# Patient Record
Sex: FEMALE | Race: WHITE | Employment: FULL TIME | URBAN - METROPOLITAN AREA
[De-identification: names, ages, dates, MRNs, and addresses within clinical notes are randomized per-mention and may not be internally consistent; named-entity substitution may affect disease eponyms.]

---
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
## 2018-01-26 ENCOUNTER — OFFICE VISIT (OUTPATIENT)
Dept: BEHAVIORAL/MENTAL HEALTH CLINIC | Age: 18
End: 2018-01-26

## 2018-01-26 VITALS
BODY MASS INDEX: 20.98 KG/M2 | HEART RATE: 81 BPM | DIASTOLIC BLOOD PRESSURE: 65 MMHG | WEIGHT: 114 LBS | HEIGHT: 62 IN | SYSTOLIC BLOOD PRESSURE: 117 MMHG

## 2018-01-26 DIAGNOSIS — F33.2 SEVERE EPISODE OF RECURRENT MAJOR DEPRESSIVE DISORDER, WITHOUT PSYCHOTIC FEATURES (HCC): Primary | ICD-10-CM

## 2018-01-26 DIAGNOSIS — F41.9 ANXIETY: ICD-10-CM

## 2018-01-26 RX ORDER — ARIPIPRAZOLE 5 MG/1
5 TABLET ORAL DAILY
Qty: 30 TAB | Refills: 1 | Status: SHIPPED | OUTPATIENT
Start: 2018-01-26 | End: 2018-02-22 | Stop reason: SDUPTHER

## 2018-01-26 NOTE — MR AVS SNAPSHOT
[address] 
[phone] Patient: Constantine Haynes MRN: [id number] AEC:[birth year] Visit Information Date & Time Provider Department Dept. Phone Encounter #  
 1/26/2018 11:30 AM Dannis Simmonds, NP [address] [facility] [phone] [id number] Follow-up Instructions Return in about 5 weeks (around 3/2/2018). Upcoming Health Maintenance Date Due DTaP/Tdap/Td series (2 - Tdap) 4/16/2010 HPV AGE 9Y-26Y (2 of 3 - Female 3 Dose Series) 4/5/2016 Influenza Age 5 to Adult 8/1/2017 Allergies as of 1/26/2018  Review Complete On: 1/26/2018 By: Lara Luis Severity Noted Reaction Type Reactions Peanut High 07/14/2017    Anaphylaxis Current Immunizations  Never Reviewed Name Date DTaP-Hep B-IPV 4/15/1999, 1/20/1999, 1998 HPV 2/9/2016 Hep A Vaccine 2/9/2016, 11/7/2006 Hib 9/30/1999, 4/15/1999, 1/20/1999, 1998 IPV 8/26/2003, 9/30/1999, 4/15/1999, 1/20/1999, 1998 MMR 8/26/2003, 1/21/2000 Meningococcal (MCV4) Vaccine 2/9/2016, 9/22/2010 Td 3/19/2010 Tdap 8/26/2003, 9/30/1999, 4/15/1999, 1/20/1999, 1998 Varicella Virus Vaccine 9/22/2010, 1/21/2000 Not reviewed this visit You Were Diagnosed With   
  
 Codes Comments Severe episode of recurrent major depressive disorder, without psychotic features ([facility] [address]    -  Primary ICD-10-CM: F33.2 ICD-9-CM: 296.33 with mixed features Anxiety     ICD-10-CM: F41.9 ICD-9-CM: 300.00 Depression, unspecified depression type     ICD-10-CM: F32.9 ICD-9-CM: 328 Vitals BP Pulse Height(growth percentile) Weight(growth percentile) BMI OB Status 117/65 (81 %/ 57 %)* 81 5' 2\" (1.575 m) (19 %, Z= -0.90) 114 lb (51.7 kg) (23 %, Z= -0.73) 20.85 kg/m2 (40 %, Z= -0.25) Having regular periods Smoking Status Never Smoker *BP percentiles are based on NHBPEP's 4th Report Growth percentiles are based on [facility] 2-20 Years data. BMI and BSA Data Body Mass Index Body Surface Area  
 20.85 kg/m 2 1.5 m 2 Preferred Pharmacy Pharmacy Name Phone [facility] [address] [place] [address] [place] [address] [phone] Your Updated Medication List  
  
   
This list is accurate as of: 1/26/18 12:06 PM.  Always use your most recent med list.  
  
  
  
  
 ARIPiprazole 5 mg tablet Commonly known as:  ABILIFY Take 1 Tab by mouth daily. norgestimate-ethinyl estradiol 0.18/0.215/0.25 mg-35 mcg (28) Tab Commonly known as:  ORTHO TRI-CYCLEN, TRI-SPRINTEC Take 1 Tab by mouth daily. Indications: DYSMENORRHEA  
  
 simethicone 125 mg capsule Commonly known as:  GAS-X Take 125 mg by mouth four (4) times daily as needed for Flatulence. vortioxetine 5 mg tablet Commonly known as:  TRINTELLIX Take 1 Tab by mouth daily. Prescriptions Sent to Pharmacy Refills ARIPiprazole (ABILIFY) 5 mg tablet 1 Sig: Take 1 Tab by mouth daily. Class: Normal  
 Pharmacy: [facility] #: [phone] Route: Oral  
 vortioxetine (TRINTELLIX) 5 mg tablet 3 Sig: Take 1 Tab by mouth daily. Class: Normal  
 Pharmacy: [address] #: [phone] Route: Oral  
  
Follow-up Instructions Return in about 5 weeks (around 3/2/2018). Introducing [facility] & [facility]! Abhishek Xiong introduces [employer] patient portal. Now you can access parts of your medical record, email your doctor's office, and request medication refills online. 1. In your internet browser, go to [email or web address] 2. Click on the First Time User? Click Here link in the Sign In box. You will see the New Member Sign Up page. 3. Enter your [employer] Access Code exactly as it appears below. You will not need to use this code after youve completed the sign-up process. If you do not sign up before the expiration date, you must request a new code. · [employer]itus Access Code: [email or web address] Expires: 3/21/2018  9:55 AM 
 
4. Enter the last four digits of your Social Security Number (xxxx) and Date of Birth (mm/dd/yyyy) as indicated and click Submit. You will be taken to the next sign-up page. 5. Create a ubitus ID. This will be your ubitus login ID and cannot be changed, so think of one that is secure and easy to remember. 6. Create a ubitus password. You can change your password at any time. 7. Enter your Password Reset Question and Answer. This can be used at a later time if you forget your password. 8. Enter your e-mail address. You will receive e-mail notification when new information is available in [address] 9. Click Sign Up. You can now view and download portions of your medical record. 10. Click the Download Summary menu link to download a portable copy of your medical information. If you have questions, please visit the Frequently Asked Questions section of the [employer]itus website. Remember, [employer]itus is NOT to be used for urgent needs. For medical emergencies, dial 911. Now available from your iPhone and Android! Please provide this summary of care documentation to your next provider. Your primary care clinician is listed as Melania Grigsby. If you have any questions after today's visit, please call [phone].

## 2018-01-26 NOTE — PROGRESS NOTES
CHIEF COMPLAINT:  Sue Delgdao is a 21 y.o. female and was seen today for follow-up of psychiatric condition and psychotropic medication management. HPI:    HPI    Angel Dixon reports the following psychiatric symptoms:  depression, agitation and anxiety. The symptoms have been present for years and are of moderate/high severity. The symptoms occur daily. Pt reports that symptoms decreased in severity with current tx plan. Pt is here today to discuss her tx plan. FAMILY/SOCIAL HX: Mother's MH is doing better on new medications. REVIEW OF SYSTEMS:  ROS    Psychiatric:  depression, anxiety  Appetite:no change from normal   Sleep: good -improved with medication. Neuro: None noted    Visit Vitals    /65    Pulse 81    Ht 5' 2\" (1.575 m)    Wt 51.7 kg (114 lb)    BMI 20.85 kg/m2       Side Effects:  none    MENTAL STATUS EXAM:   Sensorium  oriented to time, place and person   Relations cooperative   Appearance:  casually dressed   Motor Behavior:  within normal limits   Speech:  normal pitch and normal volume   Thought Process: goal directed   Thought Content free of delusions and free of hallucinations   Suicidal ideations no plan  and no intention/ death wishes    Homicidal ideations no plan  and no intention   Mood:  anxious, depressed and sad   Affect:  depressed and sad   Memory recent  adequate and impaired   Memory remote:  adequate   Concentration:  adequate   Abstraction:  abstract   Insight:  good   Reliability good   Judgment:  good     MEDICAL DECISION MAKING:  Problems addressed today:    ICD-10-CM ICD-9-CM    1. Severe episode of recurrent major depressive disorder, without psychotic features [facility] F33.2 296.33 vortioxetine (TRINTELLIX) 5 mg tablet    with mixed features   2. Anxiety F41.9 300.00        Assessment:   Angel Dixon is responding to treatment. Pt reports that symptoms did decrease in severity with current tx plan.  Pt reports that after being on 2mg of abilify for about 3 weeks, her symptoms started to increase again. Will increase to 5mg of abilify to help tx depression with mixed features. Pt reports that she stopped taking trintillex for a week, discussed resuming it at 5mg dose. Discussed the importance of ind therapy to better tx symptoms and pt agrees. Pt given resources for ind therapy. Provided support and encouragement. SAFE-T ASSESSMENT:   Risk Factors: depressive and anxiety symptoms. Protective Factors/strengths: Animals, does not like pain, supportive parents. Suicide Thoughts/Plans/Behaviors/Intent: Denies thoughts but does endorses death wishes at times. Assessment of risk/intervention/follow up: Will f/u     PSYCHOTHERAPY:  approx 20 minutes  Type:  Mindfulness, Supportive  Focus:  Discussing coping mechanisms to help tx symptoms. Discussed different mindfulness techniques to help decrease symptoms. Discussed how to incorporate mindfulness techniques into home and work life. Rios Childs is progressing. Total encounter time was 30 minutes; >50% of time was spent counseling/coordinating care regarding depression and anxiety. Plan:   1. Current Outpatient Prescriptions   Medication Sig Dispense Refill    ARIPiprazole (ABILIFY) 5 mg tablet Take 1 Tab by mouth daily. 30 Tab 1    vortioxetine (TRINTELLIX) 5 mg tablet Take 1 Tab by mouth daily. 30 Tab 3    norgestimate-ethinyl estradiol (ORTHO TRI-CYCLEN, TRI-SPRINTEC) 0.18/0.215/0.25 mg-35 mcg (28) tab Take 1 Tab by mouth daily. Indications: DYSMENORRHEA 3 Dose Pack 3    simethicone (GAS-X) 125 mg capsule Take 125 mg by mouth four (4) times daily as needed for Flatulence. medication changes made today: cont trintillex 5mg for depression/anxiety, increase abilify 5mg for tx resistant depression/mixed features. 2.  Counseling and coordination of care including instructions for treatment, risks/benefits, risk factor reduction and patient/family education. She agrees with the plan.  Patient instructed to call with any side effects, questions or issues. 3.  Follow-up Disposition:  Return in about 5 weeks (around 3/2/2018).     1/26/2018  [address] [place] Katie, JACKIE

## 2018-02-22 ENCOUNTER — OFFICE VISIT (OUTPATIENT)
Dept: BEHAVIORAL/MENTAL HEALTH CLINIC | Age: 18
End: 2018-02-22

## 2018-02-22 VITALS
SYSTOLIC BLOOD PRESSURE: 104 MMHG | BODY MASS INDEX: 20.24 KG/M2 | HEIGHT: 62 IN | WEIGHT: 110 LBS | DIASTOLIC BLOOD PRESSURE: 74 MMHG | HEART RATE: 85 BPM

## 2018-02-22 DIAGNOSIS — F33.1 MODERATE EPISODE OF RECURRENT MAJOR DEPRESSIVE DISORDER (HCC): Primary | ICD-10-CM

## 2018-02-22 DIAGNOSIS — F41.9 ANXIETY: ICD-10-CM

## 2018-02-22 RX ORDER — ARIPIPRAZOLE 5 MG/1
5 TABLET ORAL DAILY
Qty: 30 TAB | Refills: 1 | Status: SHIPPED | OUTPATIENT
Start: 2018-02-22 | End: 2018-04-26 | Stop reason: SDDI

## 2018-02-22 NOTE — PROGRESS NOTES
CHIEF COMPLAINT:  Gnei Jordan is a 21 y.o. female and was seen today for follow-up of psychiatric condition and psychotropic medication management. HPI:    HPI    Louisa Meza reports the following psychiatric symptoms:  depression, agitation and anxiety. The symptoms have been present for years and are of moderate severity. The symptoms occur daily. Pt reports symptoms have decreased with current tx plan. Pt is here today to discuss her tx plan. FAMILY/SOCIAL HX: Has hung out with friends more lately. Will be participating in GI study for medication. REVIEW OF SYSTEMS:  ROS    Psychiatric:  depression, anxiety  Appetite:\"up and down still due to GI problems\" -lost 4 lbs since last visit   Sleep: improved   Neuro: none noted     Visit Vitals    /74    Pulse 85    Ht 5' 2\" (1.575 m)    Wt 49.9 kg (110 lb)    BMI 20.12 kg/m2       Side Effects:  dizziness, akathisia    MENTAL STATUS EXAM:   Sensorium  oriented to time, place and person   Relations cooperative   Appearance:  casually dressed   Motor Behavior:  within normal limits   Speech:  normal pitch and normal volume   Thought Process: goal directed   Thought Content free of delusions and free of hallucinations   Suicidal ideations no plan  and no intention   Homicidal ideations no plan  and no intention   Mood:  anxious and depressed   Affect:  anxious   Memory recent  adequate   Memory remote:  adequate   Concentration:  adequate   Abstraction:  abstract   Insight:  good   Reliability good   Judgment:  good     MEDICAL DECISION MAKING:  Problems addressed today:    ICD-10-CM ICD-9-CM    1. Moderate episode of recurrent major depressive disorder (HCC) F33.1 296.32 vortioxetine (TRINTELLIX) 10 mg tablet    with mixed features   2. Anxiety F41.9 300.00        Assessment:   Louisa Meza is responding to treatment. Pt reports symptoms have decreased in severity.  Pt is still reporting depression and anxiety that are moderate in severity and not being able to focus or stay on task. Discussed how these symptoms could be related to depression and anxiety and that we should reassess these symptoms after treating depression and anxiety adequately. Pt verbalized understanding. Will increase Trintillex to 10mg and continue abilify at 5mg for symptoms. Pt does report some side effects r/t to abilify including dizziness and akathisia. Pt reports that it is not problematic for her at this time and is going to try to take it at night to see if this provides her with a decrease in symptoms. Discussed the importance of ind therapy to better tx symptoms and pt is interested but has a hard time following through with calling to set up the appointment. Provided psycho education re: CBT and challenging negative thoughts. Provided support and encouragement. SAFE-T ASSESSMENT:   Risk Factors: depressive and anxiety symptoms. Protective Factors/strengths: Animals, does not like pain, supportive parents. Suicide Thoughts/Plans/Behaviors/Intent: Denies    Assessment of risk/intervention/follow up: Will f/u       Total encounter time was 25 minutes; >50% of time was spent counseling/coordinating care regarding anxiety/depression. Plan:   1. Current Outpatient Prescriptions   Medication Sig Dispense Refill    ARIPiprazole (ABILIFY) 5 mg tablet Take 1 Tab by mouth daily. 30 Tab 1    vortioxetine (TRINTELLIX) 10 mg tablet Take 1 Tab by mouth daily. 30 Tab 1    norgestimate-ethinyl estradiol (ORTHO TRI-CYCLEN, TRI-SPRINTEC) 0.18/0.215/0.25 mg-35 mcg (28) tab Take 1 Tab by mouth daily. Indications: DYSMENORRHEA 3 Dose Pack 3    simethicone (GAS-X) 125 mg capsule Take 125 mg by mouth four (4) times daily as needed for Flatulence. medication changes made today: increase trintillex 10 mg for depression/anxiety, cont abilify 5mg for tx resistant depression/mixed features.      2.  Counseling and coordination of care including instructions for treatment, risks/benefits, risk factor reduction and patient/family education. She agrees with the plan. Patient instructed to call with any side effects, questions or issues. 3.  Follow-up Disposition:  Return in about 8 weeks (around 4/19/2018).     2/22/2018  [address] [place] Katie, JACKIE

## 2018-04-26 ENCOUNTER — OFFICE VISIT (OUTPATIENT)
Dept: BEHAVIORAL/MENTAL HEALTH CLINIC | Age: 18
End: 2018-04-26

## 2018-04-26 VITALS
HEART RATE: 79 BPM | BODY MASS INDEX: 20.24 KG/M2 | WEIGHT: 110 LBS | SYSTOLIC BLOOD PRESSURE: 113 MMHG | DIASTOLIC BLOOD PRESSURE: 76 MMHG | HEIGHT: 62 IN

## 2018-04-26 DIAGNOSIS — F33.1 MODERATE EPISODE OF RECURRENT MAJOR DEPRESSIVE DISORDER (HCC): Primary | ICD-10-CM

## 2018-04-26 DIAGNOSIS — F41.9 ANXIETY: ICD-10-CM

## 2018-04-26 NOTE — PROGRESS NOTES
CHIEF COMPLAINT:  Tiff Escalante is a 21 y.o. female and was seen today for follow-up of psychiatric condition and psychotropic medication management. HPI:    HPI    Sindy Hernandez reports the following psychiatric symptoms:  depression, agitation and anxiety. The symptoms have been present for years and are of moderate/high severity. The symptoms occur daily. Pt reports symptoms have remained the same with current tx plan. Pt is here today to discuss her tx plan. FAMILY/SOCIAL HX: Mother is going to stop taking her antidepressant to see if her symptoms are still in remission without medication. REVIEW OF SYSTEMS:  ROS    Psychiatric:  anxiety  Appetite:increased- no weight gain or weight loss since last appointment. Sleep: poor with DIS (difficulty initiating sleep) uses OTC melatonin with benefit. Neuro: Denies    Visit Vitals    /76    Pulse 79    Ht 5' 2\" (1.575 m)    Wt 49.9 kg (110 lb)    BMI 20.12 kg/m2       Side Effects:  akathisia and dizziness ( with trintellix and abilify)    MENTAL STATUS EXAM:   Sensorium  oriented to time, place and person   Relations cooperative   Appearance:  casually dressed   Motor Behavior:  within normal limits   Speech:  normal pitch and normal volume   Thought Process: goal directed   Thought Content free of delusions and free of hallucinations   Suicidal ideations no plan  and no intention   Homicidal ideations no plan  and no intention   Mood:  anxious   Affect:  anxious   Memory recent  adequate and impaired   Memory remote:  adequate   Concentration:  adequate and impaired   Abstraction:  abstract   Insight:  fair and good   Reliability fair   Judgment:  fair and good     MEDICAL DECISION MAKING:  Problems addressed today:    ICD-10-CM ICD-9-CM    1. Moderate episode of recurrent major depressive disorder (HCC) F33.1 296.32    2. Anxiety F41.9 300.00        Assessment:   Sindy Hernandez is not responding to treatment.  Pt stopped taking all her medications a few days ago due to her not feeling like they are beneficial for symptoms. Pt would like to discuss other tx options today and would like to d/c trintellix and abilify. Discussed tx options and side effects today and pt verbalized understanding. Today, will start vraylar 1.5mg for tx resistant depression. Pt reports she has been unsuccessful in getting ind therapy set up but is still trying to get an appointment. Also, recommended TMS tx due to tx resistant symptoms with multiple medication trials. Pt given information on [address] [place] and will look into it. Provided support and encouragement. SAFE-T ASSESSMENT:   Risk Factors: depressive and anxiety symptoms. Protective Factors/strengths: Animals, does not like pain, supportive parents. Suicide Thoughts/Plans/Behaviors/Intent: Denies    Assessment of risk/intervention/follow up: Will f/u       Plan:   1. Current Outpatient Prescriptions   Medication Sig Dispense Refill    cariprazine (VRAYLAR) 1.5 mg capsule Take 1 Cap by mouth daily. 30 Cap 1    norgestimate-ethinyl estradiol (ORTHO TRI-CYCLEN, TRI-SPRINTEC) 0.18/0.215/0.25 mg-35 mcg (28) tab Take 1 Tab by mouth daily. Indications: DYSMENORRHEA 3 Dose Pack 3    simethicone (GAS-X) 125 mg capsule Take 125 mg by mouth four (4) times daily as needed for Flatulence. medication changes made today: d/c trintillex, d/c Abilify, start vraylar 1.5mg for tx resistant depression. 2.  Counseling and coordination of care including instructions for treatment, risks/benefits, risk factor reduction and patient/family education. She agrees with the plan. Patient instructed to call with any side effects, questions or issues. 3.  Follow-up Disposition:  Return in about 3 weeks (around 5/17/2018).     4/26/2018  Mary Harman NP

## 2018-04-26 NOTE — MR AVS SNAPSHOT
[address] [facility] 
[phone] Patient: Shalonda Fulton MRN: [id number] NGR:[birth year] Visit Information Date & Time Provider Department Dept. Phone Encounter #  
 4/26/2018  3:30 PM [address], [place] [address] [facility] [phone] [id number] Follow-up Instructions Return in about 3 weeks (around 5/17/2018). Upcoming Health Maintenance Date Due DTaP/Tdap/Td series (2 - Tdap) 4/16/2010 HPV Age 9Y-34Y (2 of 3 - Female 3 Dose Series) 4/5/2016 Influenza Age 5 to Adult 8/1/2017 Allergies as of 4/26/2018  Review Complete On: 4/26/2018 By: Faby Wilson NP Severity Noted Reaction Type Reactions Peanut High 07/14/2017    Anaphylaxis Current Immunizations  Never Reviewed Name Date DTaP-Hep B-IPV 4/15/1999, 1/20/1999, 1998 HPV 2/9/2016 Hep A Vaccine 2/9/2016, 11/7/2006 Hib 9/30/1999, 4/15/1999, 1/20/1999, 1998 IPV 8/26/2003, 9/30/1999, 4/15/1999, 1/20/1999, 1998 MMR 8/26/2003, 1/21/2000 Meningococcal (MCV4) Vaccine 2/9/2016, 9/22/2010 Td 3/19/2010 Tdap 8/26/2003, 9/30/1999, 4/15/1999, 1/20/1999, 1998 Varicella Virus Vaccine 9/22/2010, 1/21/2000 Not reviewed this visit You Were Diagnosed With   
  
 Codes Comments Moderate episode of recurrent major depressive disorder ([facility]    -  Primary ICD-10-CM: F33.1 ICD-9-CM: 296.32 Anxiety     ICD-10-CM: F41.9 ICD-9-CM: 300.00 Vitals BP Pulse Height(growth percentile) Weight(growth percentile) BMI OB Status 113/76 (70 %/ 89 %)* 79 5' 2\" (1.575 m) (18 %, Z= -0.90) 110 lb (49.9 kg) (15 %, Z= -1.02) 20.12 kg/m2 (30 %, Z= -0.53) Having regular periods Smoking Status Never Smoker *BP percentiles are based on NHBPEP's 4th Report Growth percentiles are based on [facility] 2-20 Years data. Vitals History BMI and BSA Data Body Mass Index Body Surface Area  
 20.12 kg/m 2 1.48 m 2 Preferred Pharmacy Pharmacy Name Phone [facility] [address] [place] [address] [phone] Your Updated Medication List  
  
   
This list is accurate as of 4/26/18  4:17 PM.  Always use your most recent med list.  
  
  
  
  
 cariprazine 1.5 mg capsule Commonly known as:  [place] Take 1 Cap by mouth daily. norgestimate-ethinyl estradiol 0.18/0.215/0.25 mg-35 mcg (28) Tab Commonly known as:  ORTHO TRI-CYCLEN, TRI-SPRINTEC Take 1 Tab by mouth daily. Indications: DYSMENORRHEA  
  
 simethicone 125 mg capsule Commonly known as:  GAS-X Take 125 mg by mouth four (4) times daily as needed for Flatulence. Prescriptions Sent to Pharmacy Refills  
 cariprazine (VRAYLAR) 1.5 mg capsule 1 Sig: Take 1 Cap by mouth daily. Class: Normal  
 Pharmacy: [address] Ph #: [phone] Route: Oral  
  
Follow-up Instructions Return in about 3 weeks (around 5/17/2018). Introducing [facility]! [employer] introduces [facility] [place] [facility] patient portal. Now you can access parts of your medical record, email your doctor's office, and request medication refills online. 1. In your internet browser, go to [email or web address]. [email or web address] 2. Click on the First Time User? Click Here link in the Sign In box. You will see the New Member Sign Up page. 3. Enter your [facility] Access Code exactly as it appears below. You will not need to use this code after youve completed the sign-up process. If you do not sign up before the expiration date, you must request a new code. · [facility] [place] [facility] Access Code: [facility] Expires: 7/25/2018  4:17 PM 
 
4. Enter the last four digits of your Social Security Number (xxxx) and Date of Birth (mm/dd/yyyy) as indicated and click Submit.  You will be taken to the next sign-up page. 5. Create a [facility] ID. This will be your [facility] login ID and cannot be changed, so think of one that is secure and easy to remember. 6. Create a [facility] password. You can change your password at any time. 7. Enter your Password Reset Question and Answer. This can be used at a later time if you forget your password. 8. Enter your e-mail address. You will receive e-mail notification when new information is available in [address] 9. Click Sign Up. You can now view and download portions of your medical record. 10. Click the Download Summary menu link to download a portable copy of your medical information. If you have questions, please visit the Frequently Asked Questions section of the MEEP website. Remember, [facility] is NOT to be used for urgent needs. For medical emergencies, dial 911. Now available from your iPhone and Android! Please provide this summary of care documentation to your next provider. Your primary care clinician is listed as Leana Varma. If you have any questions after today's visit, please call [phone].

## 2018-06-15 ENCOUNTER — OFFICE VISIT (OUTPATIENT)
Dept: BEHAVIORAL/MENTAL HEALTH CLINIC | Age: 18
End: 2018-06-15

## 2018-06-15 VITALS
BODY MASS INDEX: 19.84 KG/M2 | HEART RATE: 87 BPM | HEIGHT: 62 IN | WEIGHT: 107.8 LBS | DIASTOLIC BLOOD PRESSURE: 59 MMHG | SYSTOLIC BLOOD PRESSURE: 92 MMHG

## 2018-06-15 DIAGNOSIS — F33.1 MODERATE EPISODE OF RECURRENT MAJOR DEPRESSIVE DISORDER (HCC): Primary | ICD-10-CM

## 2018-06-15 DIAGNOSIS — F41.1 GENERALIZED ANXIETY DISORDER: ICD-10-CM

## 2018-06-15 RX ORDER — ESCITALOPRAM OXALATE 20 MG/1
20 TABLET ORAL DAILY
Qty: 30 TAB | Refills: 2 | Status: SHIPPED | OUTPATIENT
Start: 2018-06-15 | End: 2018-10-15 | Stop reason: SDUPTHER

## 2018-06-15 RX ORDER — BUPROPION HYDROCHLORIDE 150 MG/1
150 TABLET ORAL
Qty: 30 TAB | Refills: 1 | Status: SHIPPED | OUTPATIENT
Start: 2018-06-15 | End: 2018-09-17 | Stop reason: SDUPTHER

## 2018-06-15 RX ORDER — MELATONIN 5 MG
5 CAPSULE ORAL
COMMUNITY
End: 2018-10-15 | Stop reason: CLARIF

## 2018-06-15 RX ORDER — GLUCOSAMINE/CHONDR SU A SOD 750-600 MG
TABLET ORAL
COMMUNITY
End: 2021-02-23

## 2018-06-15 NOTE — PROGRESS NOTES
Dea Thacker  2000  21 y.o.  female  [facility]    Chief Complaint   Patient presents with    Depression    Anxiety    Sleep Apnea       [place]:   This is a 56-year-old single never   female with past psychiatric history and treatment of severe depression and anxiety disorder who is patient of nurse practitioner Bandar Greco last seen on April 26, 2018 when she was prescribed Vraylar 1.5 mg daily and she is taking melatonin 5 mg as needed for insomnia. She was seen for an urgent appointment in coverage for [facility] and she handed me a bottle of Lexapro 20 mg with her mother's name feel that [facility] [place] and said that she stopped taking Dayana Noon about 3 weeks ago due to severe panic attacks which is started after 2 weeks of medication and she suffered for 1 week and is stop taking it. Panic attack went away and she is taking Lexapro for past couple of weeks she just started at the maximum dose and has very little benefit at this time. Patient presented on time was observed to be not motivated and indifferent mostly answered by saying she does not know she has been tried on tons of medication and she has no idea about why she took it or how long she took it or what happened when she said that they all work for some time and then stop  Working. She was on Abilify and Trintellix which was increase and finally was discontinued and she was a started on Vraylar. She report classic symptoms of major depression and generalized anxiety and she denies ever having any manic or hypomanic episode she denies ever having any hallucination she denies any PTSD or OCD symptoms. She was provided with a lot of support and psychoeducation and after discussing risk and benefit she decided to continue Lexapro and add Wellbutrin  mg for her reported symptoms of fatigue, poor motivation, and cognitive issues.   She will return in 4 weeks for reevaluation      SUBSTANCE USE HISTORY:   TOBACCO: Never smoke. ALCOHOL: Drink very infrequently will be couple of times a year and denies any abuse  CANNABIS: Smoked marijuana recreational basis up until 6 months ago. COCAINE, OPIOIDS, and OTHERS: Patient denies. MEDICAL HISTORY:    has a past medical history of Contact dermatitis and other eczema, due to unspecified cause; Depression; Gastroparesis; and GERD (gastroesophageal reflux disease). She also has no past medical history of Anemia; Arrhythmia; Arthritis; Asthma; Autoimmune disease ([facility] CAD (coronary artery disease); Calculus of kidney; Cancer ([facility] Chronic kidney disease; Chronic obstructive pulmonary disease ([facility] Chronic pain; Congestive heart failure ([facility] Diabetes ([facility] Headache; Hypercholesterolemia; Hypertension; Liver disease; Psychotic disorder; PUD (peptic ulcer disease); Seizures ([facility] Stroke [facility]); Th[facility]ol[facility] Thyroid disease; or Trauma. ALLERGIES:   Allergies   Allergen Reactions    Peanut Anaphylaxis       VITAL SIGNS:  BP 92/59  Pulse 87  Ht 5' 2\" (1.575 m)  Wt 48.9 kg (107 lb 12.8 oz)  BMI 19.72 kg/m2    Current Outpatient Prescriptions   Medication Sig Dispense Refill    Biotin 2,500 mcg cap Take  by mouth.  melatonin 5 mg cap capsule Take 5 mg by mouth nightly.  buPROPion XL (WELLBUTRIN XL) 150 mg tablet Take 1 Tab by mouth every morning. Indications: major depressive disorder 30 Tab 1    escitalopram oxalate (LEXAPRO) 20 mg tablet Take 1 Tab by mouth daily. Indications: Generalized Anxiety Disorder, major depressive disorder 30 Tab 2    norgestimate-ethinyl estradiol (ORTHO TRI-CYCLEN, TRI-SPRINTEC) 0.18/0.215/0.25 mg-35 mcg (28) tab Take 1 Tab by mouth daily. Indications: DYSMENORRHEA 3 Dose Pack 3    simethicone (GAS-X) 125 mg capsule Take 125 mg by mouth four (4) times daily as needed for Flatulence. MENTAL STATUS EXAMINATION:   Patient is a 21 y.o. female [facility] who looks slightly younger than her stated age.   She is slightly underweight. Patient appearance is appropriately dressed with fair hygiene. Speech is regular rate and rhythm, fluent language, and thought process is linear, logical, and goal directed. Reported mood is depressed with mood congruent depressed affect. Patient denies any suicidal ideation, homicidal ideation, and auditory visual hallucination. Not observed to be delusional or paranoid. Insight, judgement, reliability and impulse control is limited. DIAGNOSIS:  Encounter Diagnoses   Name Primary?  Moderate episode of recurrent major depressive disorder (HCC) Yes    Generalized anxiety disorder        PLAN:  1. MEDICATION: Patient is stop taking Vraylar about 3 weeks ago due to side effect of nightmares. She started to take Lexapro 20 mg daily which is her mother's medication but she has taken in past and report slight improvement but requested help with poor motivation, poor memory, and severe daytime fatigue. She agreed to try Wellbutrin  mg daily and return in 4 weeks for reevaluation. She was a strongly encourage not to make changes with her psychiatric medication on her own without consulting her provider and advised not to make things complicated. She was provided with a lot of support and educated about beginning signs of impending hypomanic episode especially not requiring sleep and she will call me as soon as possible if that happened. Patient was provided with psycho education, discussed risk/benefits, and expectations from medication changes. Patient agrees with plan. 2. PSYCHOTHERAPY: Patient was provided with supportive therapy, strongly encourage to seek psychotherapy. 3. MEDICAL CARE: Patient was strongly encourage to take their medical medications and follow up with their PCP on regular basis. 4. SUBSTANCE ABUSE CARE: Patient denies a smoking, drinking, or abusing any illicit drugs at this time.     5. FOLLOW UP:   Follow-up Disposition:  Return in about 4 weeks (around 7/13/2018) for Medication management.

## 2018-06-15 NOTE — MR AVS SNAPSHOT
[facility] [address] 
[phone] Patient: Santos Joyce MRN: [id number] [facility]:[birth year] Visit Information Date & Time Provider Department Dept. Phone Encounter #  
 6/15/2018  9:30 AM MD Cherelle Jerry 178 [phone] [id number] Your Appointments 7/30/2018  3:00 PM  
ESTABLISHED PATIENT with MD Cherelle Jerry 178 [facility] Appt Note: 4 week f/u  
 [address] [address]  
[facility] [place] [address] Upcoming Health Maintenance Date Due DTaP/Tdap/Td series (2 - Tdap) 4/16/2010 HPV Age 9Y-34Y (2 of 3 - Female 3 Dose Series) 4/5/2016 Influenza Age 5 to Adult 8/1/2018 Allergies as of 6/15/2018  Review Complete On: 6/15/2018 By: Cyrus Reynolds Severity Noted Reaction Type Reactions Peanut High 07/14/2017    Anaphylaxis Current Immunizations  Never Reviewed Name Date DTaP-Hep B-IPV 4/15/1999, 1/20/1999, 1998 HPV 2/9/2016 Hep A Vaccine 2/9/2016, 11/7/2006 Hib 9/30/1999, 4/15/1999, 1/20/1999, 1998 IPV 8/26/2003, 9/30/1999, 4/15/1999, 1/20/1999, 1998 MMR 8/26/2003, 1/21/2000 Meningococcal (MCV4) Vaccine 2/9/2016, 9/22/2010 Td 3/19/2010 Tdap 8/26/2003, 9/30/1999, 4/15/1999, 1/20/1999, 1998 Varicella Virus Vaccine 9/22/2010, 1/21/2000 Not reviewed this visit Vitals BP Pulse Height(growth percentile) Weight(growth percentile) BMI OB Status 92/59 (7 %/ 39 %)* 87 5' 2\" (1.575 m) (18 %, Z= -0.90) 107 lb 12.8 oz (48.9 kg) (12 %, Z= -1.19) 19.72 kg/m2 (24 %, Z= -0.70) Having regular periods Smoking Status Never Smoker *BP percentiles are based on NHBPEP's 4th Report Growth percentiles are based on [facility] 2-20 Years data. Vitals History BMI and BSA Data Body Mass Index Body Surface Area  
 19.72 kg/m 2 1.46 m 2 Preferred Pharmacy Pharmacy Name Phone [place] [address] [phone] Your Updated Medication List  
  
   
This list is accurate as of 6/15/18  9:53 AM.  Always use your most recent med list.  
  
  
  
  
 Biotin 2,500 mcg Cap Take  by mouth. buPROPion  mg tablet Commonly known as:  Hammer Budge Take 1 Tab by mouth every morning. Indications: major depressive disorder  
  
 escitalopram oxalate 20 mg tablet Commonly known as:  Mason Orts Take 1 Tab by mouth daily. Indications: Generalized Anxiety Disorder, major depressive disorder  
  
 melatonin 5 mg Cap capsule Take 5 mg by mouth nightly. norgestimate-ethinyl estradiol 0.18/0.215/0.25 mg-35 mcg (28) Tab Commonly known as:  ORTHO TRI-CYCLEN, TRI-SPRINTEC Take 1 Tab by mouth daily. Indications: DYSMENORRHEA  
  
 simethicone 125 mg capsule Commonly known as:  GAS-X Take 125 mg by mouth four (4) times daily as needed for Flatulence. Prescriptions Sent to Pharmacy Refills buPROPion XL (WELLBUTRIN XL) 150 mg tablet 1 Sig: Take 1 Tab by mouth every morning. Indications: major depressive disorder Class: Normal  
 Pharmacy: [place] [address] Ph #: [phone] Route: Oral  
 escitalopram oxalate (LEXAPRO) 20 mg tablet 2 Sig: Take 1 Tab by mouth daily. Indications: Generalized Anxiety Disorder, major depressive disorder Class: Normal  
 Pharmacy: [place] [address] Ph #: [phone] Route: Oral  
  
Introducing [facility] & HEALTH SERVICES! Teressa Scherer introduces CareLuLu patient portal. Now you can access parts of your medical record, email your doctor's office, and request medication refills online.    
 
1. In your internet browser, go to [email or web address] 2. Click on the First Time User? Click Here link in the Sign In box. You will see the New Member Sign Up page. 3. Enter your [employer] Access Code exactly as it appears below. You will not need to use this code after youve completed the sign-up process. If you do not sign up before the expiration date, you must request a new code. · [employer] Access Code: [facility] Expires: 7/25/2018  4:17 PM 
 
4. Enter the last four digits of your Social Security Number (xxxx) and Date of Birth (mm/dd/yyyy) as indicated and click Submit. You will be taken to the next sign-up page. 5. Create a [employer] ID. This will be your Wooga login ID and cannot be changed, so think of one that is secure and easy to remember. 6. Create a Wooga password. You can change your password at any time. 7. Enter your Password Reset Question and Answer. This can be used at a later time if you forget your password. 8. Enter your e-mail address. You will receive e-mail notification when new information is available in [address] 9. Click Sign Up. You can now view and download portions of your medical record. 10. Click the Download Summary menu link to download a portable copy of your medical information. If you have questions, please visit the Frequently Asked Questions section of the Wooga website. Remember, Wooga is NOT to be used for urgent needs. For medical emergencies, dial 911. Now available from your iPhone and Android! Please provide this summary of care documentation to your next provider. Your primary care clinician is listed as Roby Porras. If you have any questions after today's visit, please call [phone].

## 2018-07-30 ENCOUNTER — OFFICE VISIT (OUTPATIENT)
Dept: BEHAVIORAL/MENTAL HEALTH CLINIC | Age: 18
End: 2018-07-30

## 2018-07-30 VITALS
DIASTOLIC BLOOD PRESSURE: 81 MMHG | WEIGHT: 103 LBS | HEIGHT: 62 IN | SYSTOLIC BLOOD PRESSURE: 114 MMHG | BODY MASS INDEX: 18.95 KG/M2 | HEART RATE: 79 BPM

## 2018-07-30 DIAGNOSIS — F41.1 GENERALIZED ANXIETY DISORDER: ICD-10-CM

## 2018-07-30 DIAGNOSIS — F33.1 MODERATE EPISODE OF RECURRENT MAJOR DEPRESSIVE DISORDER (HCC): Primary | ICD-10-CM

## 2018-07-30 NOTE — MR AVS SNAPSHOT
[facility] [address] [facility] 
[phone] Patient: Chelsy Haq MRN: [id number] ZPA:0/61/2518 Visit Information Date & Time Provider Department Dept. Phone Encounter #  
 7/30/2018  3:00 PM Cherelle Hernandez [phone] [id number] Upcoming Health Maintenance Date Due DTaP/Tdap/Td series (2 - Tdap) 4/16/2010 HPV Age 9Y-34Y (2 of 3 - Female 3 Dose Series) 4/5/2016 Influenza Age 5 to Adult 8/1/2018 Allergies as of 7/30/2018  Review Complete On: 7/30/2018 By: Gilford Hough Severity Noted Reaction Type Reactions Peanut High 07/14/2017    Anaphylaxis Current Immunizations  Never Reviewed Name Date DTaP-Hep B-IPV 4/15/1999, 1/20/1999, 1998 HPV 2/9/2016 Hep A Vaccine 2/9/2016, 11/7/2006 Hib 9/30/1999, 4/15/1999, 1/20/1999, 1998 IPV 8/26/2003, 9/30/1999, 4/15/1999, 1/20/1999, 1998 MMR 8/26/2003, 1/21/2000 Meningococcal (MCV4) Vaccine 2/9/2016, 9/22/2010 Td 3/19/2010 Tdap 8/26/2003, 9/30/1999, 4/15/1999, 1/20/1999, 1998 Varicella Virus Vaccine 9/22/2010, 1/21/2000 Not reviewed this visit Vitals BP Pulse Height(growth percentile) Weight(growth percentile) BMI OB Status 114/81 (75 %/ 96 %)* 79 5' 2\" (1.575 m) (18 %, Z= -0.90) 103 lb (46.7 kg) (6 %, Z= -1.56) 18.84 kg/m2 (14 %, Z= -1.10) Having regular periods Smoking Status Never Smoker *BP percentiles are based on NHBPEP's 4th Report Growth percentiles are based on CDC 2-20 Years data. Vitals History BMI and BSA Data Body Mass Index Body Surface Area  
 18.84 kg/m 2 1.43 m 2 Preferred Pharmacy Pharmacy Name Phone [facility] [address] [place] [address] [phone] Your Updated Medication List  
  
   
 This list is accurate as of 7/30/18  3:16 PM.  Always use your most recent med list.  
  
  
  
  
 Biotin 2,500 mcg Cap Take  by mouth. buPROPion  mg tablet Commonly known as:  Thomasmeir Parra Take 1 Tab by mouth every morning. Indications: major depressive disorder  
  
 escitalopram oxalate 20 mg tablet Commonly known as:  Pollyann Butte Take 1 Tab by mouth daily. Indications: Generalized Anxiety Disorder, major depressive disorder  
  
 melatonin 5 mg Cap capsule Take 5 mg by mouth nightly. norgestimate-ethinyl estradiol 0.18/0.215/0.25 mg-35 mcg (28) Tab Commonly known as:  ORTHO TRI-CYCLEN, TRI-SPRINTEC Take 1 Tab by mouth daily. Indications: DYSMENORRHEA  
  
 simethicone 125 mg capsule Commonly known as:  GAS-X Take 125 mg by mouth four (4) times daily as needed for Flatulence. Introducing hospitals & HEALTH SERVICES! [facility] introduces StageMark patient portal. Now you can access parts of your medical record, email your doctor's office, and request medication refills online. 1. In your internet browser, go to [email or web address] 2. Click on the First Time User? Click Here link in the Sign In box. You will see the New Member Sign Up page. 3. Enter your StageMark Access Code exactly as it appears below. You will not need to use this code after youve completed the sign-up process. If you do not sign up before the expiration date, you must request a new code. · StageMark Access Code: [email or web address] Expires: 10/28/2018  3:16 PM 
 
4. Enter the last four digits of your Social Security Number (xxxx) and Date of Birth (mm/dd/yyyy) as indicated and click Submit. You will be taken to the next sign-up page. 5. Create a StageMark ID. This will be your StageMark login ID and cannot be changed, so think of one that is secure and easy to remember. 6. Create a StageMark password. You can change your password at any time. 7. Enter your Password Reset Question and Answer. This can be used at a later time if you forget your password. 8. Enter your e-mail address. You will receive e-mail notification when new information is available in [address] 9. Click Sign Up. You can now view and download portions of your medical record. 10. Click the Download Summary menu link to download a portable copy of your medical information. If you have questions, please visit the Frequently Asked Questions section of the Causecast website. Remember, Causecast is NOT to be used for urgent needs. For medical emergencies, dial 911. Now available from your iPhone and Android! Please provide this summary of care documentation to your next provider. Your primary care clinician is listed as Pardeep Jackson. If you have any questions after today's visit, please call [phone].

## 2018-07-30 NOTE — PROGRESS NOTES
Wyvonna Kayser  2000  21 y.o.  female  [facility]    Chief Complaint   Patient presents with    Medication Problem    Depression    Anxiety       [place]:   This is a 20-year-old single never   female with past psychiatric history and treatment of severe depression and anxiety disorder who is patient of nurse practitioner Mary last seen on April 26, 2018 when she was prescribed Vraylar 1.5 mg daily and she is taking melatonin 5 mg as needed for insomnia. She was seen for an urgent appointment in coverage for Mary on Lauren 15, 2018 when she informed that she stopped taking Frutoso Angela about 3 weeks ago due to severe panic attacks and is started to take Lexapro 20 mg with very little benefit at this time. She decided to add Wellbutrin  mg daily for her reported symptoms of fatigue, poor motivation, and cognitive issues and return in 4 weeks for reevaluation. Patient presented on time was observed to be very much different and improved and not like her first visit when she was not motivated and indifferent mostly answered by saying she does not know. She report compliance with both of her medication, report side effect of yawning and easy bruising but requested to continue current treatment plan as she is feeling much better with improved social life. She was provided with a lot of support and psychoeducation and after discussing risk and benefit she decided to decreasing Lexapro 10 mg and continue Wellbutrin  mg to lessen side effect of yawning and bruising which is more probably coming from Lexapro. She agreed to go to ED in case of emergency and was educated about possible side effect of SSRIs.     SUBSTANCE USE HISTORY:   TOBACCO: Never smoke. ALCOHOL: Drink very infrequently will be couple of times a year and denies any abuse  CANNABIS: Smoked marijuana recreational basis up until 6 months ago. COCAINE, OPIOIDS, and OTHERS: Patient denies.     MEDICAL HISTORY: has a past medical history of Contact dermatitis and other eczema, due to unspecified cause; Depression; Gastroparesis; and GERD (gastroesophageal reflux disease). She also has no past medical history of Anemia; Arrhythmia; Arthritis; Asthma; Autoimmune disease ([facility] CAD (coronary artery disease); Calculus of kidney; Cancer ([facility] Chronic kidney disease; Chronic obstructive pulmonary disease ([facility] Chronic pain; Congestive heart failure ([facility] Diabetes ([facility] Headache; Hypercholesterolemia; Hypertension; Liver disease; Psychotic disorder; PUD (peptic ulcer disease); Seizures ([facility] Stroke [facility]); Thromboembolus ([facility] Thyroid disease; or Trauma. ALLERGIES:   Allergies   Allergen Reactions    Peanut Anaphylaxis       VITAL SIGNS:  /81  Pulse 79  Ht 5' 2\" (1.575 m)  Wt 46.7 kg (103 lb)  BMI 18.84 kg/m2    Current Outpatient Prescriptions   Medication Sig Dispense Refill    Biotin 2,500 mcg cap Take  by mouth.  melatonin 5 mg cap capsule Take 5 mg by mouth nightly.  buPROPion XL (WELLBUTRIN XL) 150 mg tablet Take 1 Tab by mouth every morning. Indications: major depressive disorder 30 Tab 1    escitalopram oxalate (LEXAPRO) 20 mg tablet Take 1 Tab by mouth daily. Indications: Generalized Anxiety Disorder, major depressive disorder 30 Tab 2    norgestimate-ethinyl estradiol (ORTHO TRI-CYCLEN, TRI-SPRINTEC) 0.18/0.215/0.25 mg-35 mcg (28) tab Take 1 Tab by mouth daily. Indications: DYSMENORRHEA 3 Dose Pack 3    simethicone (GAS-X) 125 mg capsule Take 125 mg by mouth four (4) times daily as needed for Flatulence. MENTAL STATUS EXAMINATION:   Patient is a 21 y.o. female [facility] who looks her stated age. Patient appearance is appropriately dressed with good hygiene. Speech is regular rate and rhythm, fluent language, and thought process is linear, logical, and goal directed. Reported mood is betterwith mood congruent brighter affect.  Patient denies any suicidal ideation, homicidal ideation, and auditory visual hallucination. Not observed to be delusional or paranoid. Insight, judgement, reliability and impulse control is intact her cognition is within normal limit and she is observed to be reliable. DIAGNOSIS:  Encounter Diagnoses   Name Primary?  Moderate episode of recurrent major depressive disorder (HCC) Yes    Generalized anxiety disorder        PLAN:  1. MEDICATION: Continue Wellbutrin  mg daily. Decrease Lexapro 10 mg at bedtime due to the reported side effect of yawning and brusing. Patient was provided with psycho education, discussed risk/benefits, and expectations from medication changes. Patient agrees with plan. 2. PSYCHOTHERAPY: Patient was provided with supportive therapy, strongly encourage to seek psychotherapy. 3. MEDICAL CARE: Patient was strongly encourage to take their medical medications and follow up with their PCP on regular basis. She report bruising possibly because of high-dose of Lexapro which she agreed to cut down and have but is still she agreed to get help or go to ED in case of emergency. 4. SUBSTANCE ABUSE CARE: Patient denies a smoking, drinking, abusing any drugs. 5. FOLLOW UP:   Follow-up Disposition:  Return in about 4 weeks (around 8/27/2018) for Medication management.

## 2018-09-17 RX ORDER — BUPROPION HYDROCHLORIDE 150 MG/1
TABLET ORAL
Qty: 30 TAB | Refills: 0 | Status: SHIPPED | OUTPATIENT
Start: 2018-09-17 | End: 2018-10-15 | Stop reason: SDUPTHER

## 2018-10-15 ENCOUNTER — OFFICE VISIT (OUTPATIENT)
Dept: BEHAVIORAL/MENTAL HEALTH CLINIC | Age: 18
End: 2018-10-15

## 2018-10-15 VITALS
WEIGHT: 107 LBS | BODY MASS INDEX: 19.69 KG/M2 | SYSTOLIC BLOOD PRESSURE: 108 MMHG | DIASTOLIC BLOOD PRESSURE: 75 MMHG | HEIGHT: 62 IN | HEART RATE: 76 BPM

## 2018-10-15 DIAGNOSIS — F41.0 PANIC ATTACKS: ICD-10-CM

## 2018-10-15 DIAGNOSIS — F33.1 MODERATE EPISODE OF RECURRENT MAJOR DEPRESSIVE DISORDER (HCC): Primary | ICD-10-CM

## 2018-10-15 DIAGNOSIS — F41.1 GENERALIZED ANXIETY DISORDER: ICD-10-CM

## 2018-10-15 RX ORDER — ESCITALOPRAM OXALATE 20 MG/1
20 TABLET ORAL DAILY
Qty: 30 TAB | Refills: 2 | Status: SHIPPED | OUTPATIENT
Start: 2018-10-15 | End: 2019-01-30 | Stop reason: SDUPTHER

## 2018-10-15 RX ORDER — BUPROPION HYDROCHLORIDE 300 MG/1
300 TABLET ORAL
Qty: 30 TAB | Refills: 2 | Status: SHIPPED | OUTPATIENT
Start: 2018-10-15 | End: 2018-12-17

## 2018-10-15 RX ORDER — MELATONIN 1 MG/ML
5 LIQUID (ML) ORAL
Qty: 59 ML | Refills: 1 | Status: SHIPPED | OUTPATIENT
Start: 2018-10-15

## 2018-10-15 NOTE — MR AVS SNAPSHOT
[address] [facility]. 
[phone] Patient: Anegl Carranza MRN: [id number] PXF:[birth year] Visit Information Date & Time Provider Department Dept. Phone Encounter #  
 10/15/2018  8:30 AM Reddy Erazo, JACKIE [address] [facility] [phone] [id number] Follow-up Instructions Return in about 2 months (around 12/15/2018). Your Appointments 12/17/2018  2:30 PM  
ESTABLISHED PATIENT with Reddy Erazo, NP  
75[address] [facility]) Appt Note: 2 MONTH F/U  
 [address] [facility] [address] [address]  
[facility] [place] [address] [facility]. Upcoming Health Maintenance Date Due DTaP/Tdap/Td series (2 - Tdap) 4/16/2010 HPV Age 9Y-34Y (2 of 3 - Female 3 Dose Series) 4/5/2016 Influenza Age 5 to Adult 8/1/2018 Allergies as of 10/15/2018  Review Complete On: 10/15/2018 By: Clark Mcconnell Severity Noted Reaction Type Reactions Peanut High 07/14/2017    Anaphylaxis Current Immunizations  Never Reviewed Name Date DTaP-Hep B-IPV 4/15/1999, 1/20/1999, 1998 HPV 2/9/2016 Hep A Vaccine 2/9/2016, 11/7/2006 Hib 9/30/1999, 4/15/1999, 1/20/1999, 1998 IPV 8/26/2003, 9/30/1999, 4/15/1999, 1/20/1999, 1998 MMR 8/26/2003, 1/21/2000 Meningococcal (MCV4) Vaccine 2/9/2016, 9/22/2010 Td 3/19/2010 Tdap 8/26/2003, 9/30/1999, 4/15/1999, 1/20/1999, 1998 Varicella Virus Vaccine 9/22/2010, 1/21/2000 Not reviewed this visit You Were Diagnosed With   
  
 Codes Comments Moderate episode of recurrent major depressive disorder ([facility]    -  Primary ICD-10-CM: F33.1 ICD-9-CM: 296.32 Generalized anxiety disorder     ICD-10-CM: F41.1 ICD-9-CM: 300.02 Panic attacks     ICD-10-CM: F41.0 ICD-9-CM: 300.01 Vitals BP Pulse Height(growth percentile) Weight(growth percentile) BMI OB Status 108/75 76 5' 2\" (1.575 m) 107 lb (48.5 kg) 19.57 kg/m2 Having regular periods Smoking Status Never Smoker BMI and BSA Data Body Mass Index Body Surface Area  
 19.57 kg/m 2 1.46 m 2 Preferred Pharmacy Pharmacy Name Phone [facility] [address] [place] [address] [phone] Your Updated Medication List  
  
   
This list is accurate as of 10/15/18  8:59 AM.  Always use your most recent med list.  
  
  
  
  
 Biotin 2,500 mcg Cap Take  by mouth. buPROPion  mg XL tablet Commonly known as:  Kary Gregor Take 1 Tab by mouth every morning. escitalopram oxalate 20 mg tablet Commonly known as:  Herman Knee Take 1 Tab by mouth daily. Indications: Generalized Anxiety Disorder, major depressive disorder  
  
 melatonin 1 mg/mL Liqd Take 5 mg by mouth nightly as needed. norgestimate-ethinyl estradiol 0.18/0.215/0.25 mg-35 mcg (28) Tab Commonly known as:  ORTHO TRI-CYCLEN, TRI-SPRINTEC Take 1 Tab by mouth daily. Indications: DYSMENORRHEA  
  
 simethicone 125 mg capsule Commonly known as:  GAS-X Take 125 mg by mouth four (4) times daily as needed for Flatulence. Prescriptions Sent to Pharmacy Refills buPROPion XL (WELLBUTRIN XL) 300 mg XL tablet 2 Sig: Take 1 Tab by mouth every morning. Class: Normal  
 Pharmacy: [place] [address] Ph #: [phone] Route: Oral  
 escitalopram oxalate (LEXAPRO) 20 mg tablet 2 Sig: Take 1 Tab by mouth daily. Indications: Generalized Anxiety Disorder, major depressive disorder Class: Normal  
 Pharmacy: [place] [address] Ph #: [phone] Route: Oral  
 melatonin 1 mg/mL liqd 1 Sig: Take 5 mg by mouth nightly as needed.   
 Class: Normal  
 Pharmacy: [facility] [address] [facility] [place] [address]  #: [phone] Route: Oral  
  
Follow-up Instructions Return in about 2 months (around 12/15/2018). Introducing [facility] & [facility] SERVICES! [employer] introduces Codesion patient portal. Now you can access parts of your medical record, email your doctor's office, and request medication refills online. 1. In your internet browser, go to [email or web address] 2. Click on the First Time User? Click Here link in the Sign In box. You will see the New Member Sign Up page. 3. Enter your Codesion Access Code exactly as it appears below. You will not need to use this code after youve completed the sign-up process. If you do not sign up before the expiration date, you must request a new code. · Codesion Access Code: [email or web address] Expires: 10/28/2018  3:16 PM 
 
4. Enter the last four digits of your Social Security Number (xxxx) and Date of Birth (mm/dd/yyyy) as indicated and click Submit. You will be taken to the next sign-up page. 5. Create a Codesion ID. This will be your Codesion login ID and cannot be changed, so think of one that is secure and easy to remember. 6. Create a Codesion password. You can change your password at any time. 7. Enter your Password Reset Question and Answer. This can be used at a later time if you forget your password. 8. Enter your e-mail address. You will receive e-mail notification when new information is available in [address] 9. Click Sign Up. You can now view and download portions of your medical record. 10. Click the Download Summary menu link to download a portable copy of your medical information. If you have questions, please visit the Frequently Asked Questions section of the Codesion website. Remember, Codesion is NOT to be used for urgent needs. For medical emergencies, dial 911. Now available from your iPhone and Android! Please provide this summary of care documentation to your next provider. Your primary care clinician is listed as Katya Wade. If you have any questions after today's visit, please call [phone].

## 2018-10-15 NOTE — PROGRESS NOTES
CHIEF COMPLAINT:  Anali Flannery is a 19 y.o. female and was seen today for follow-up of psychiatric condition and psychotropic medication management. HPI:    HPI    Rodney Elder reports the following psychiatric symptoms:  depression, agitation and anxiety. The symptoms have been present for years and are of moderate severity. The depression symptoms are daily and anxiety symptoms are intermittent. Pt reports symptoms have decreased slightly with current tx plan. Pt is here today to discuss her tx plan. Pts score on the PHQ scale was a 15. This indicates moderate/severe depression. FAMILY/SOCIAL HX: Stressful work environment and is working full times hours, even though she is part time. REVIEW OF SYSTEMS:  ROS    Psychiatric:  depression- \"numb\"  Appetite:\"all over the place\" - gained 4 lbs since last visit on 7/30/18. Eats excessive prior to menstrual cycle but appetite is decreased the majority of the time with GI problems. Sleep: wants to sleep all the time but currently is having difficulty falling asleep. After discussing, it could be r/t to taking Wellbutrin at night.    Neuro: Denies    Visit Vitals    /75    Pulse 76    Ht 5' 2\" (1.575 m)    Wt 48.5 kg (107 lb)    BMI 19.57 kg/m2       Side Effects:  none    MENTAL STATUS EXAM:   Sensorium  oriented to time, place and person   Relations cooperative   Appearance:  age appropriate   Motor Behavior:  within normal limits   Speech:  normal pitch and normal volume   Thought Process: goal directed   Thought Content free of delusions and free of hallucinations   Suicidal ideations no plan  and no intention   Homicidal ideations no plan  and no intention   Mood:  depressed   Affect:  depressed   Memory recent  adequate and impaired   Memory remote:  adequate   Concentration:  adequate   Abstraction:  abstract   Insight:  fair   Reliability fair   Judgment:  fair     MEDICAL DECISION MAKING:  Problems addressed today:    ICD-10-CM ICD-9-CM 1. Moderate episode of recurrent major depressive disorder (HCC) F33.1 296.32    2. Generalized anxiety disorder F41.1 300.02    3. Panic attacks F41.0 300.01        Assessment:   Heide Chin is responding to treatment. Pt reports \"no matter what I'm on I still don't have interest\". Pt was started on lexapro 20mg and wellbutrin 150mg for tx, while I was out on leave. Pt still with symptoms but is interested in increasing medications. Today, will increase wellbutrin to 300mg XL and continue lexapro 20mg. Also, recommended TMS due to multiple failures to antidepressants. Pt interested and needs to complete insurance requirements. Pt has been to 5-6x therapy seeion and her [facility] if she completes 10 sessions of ind therapy. Provided support and encouragement. SAFE-T ASSESSMENT:   Risk Factors: depressive and anxiety symptoms. Protective Factors/strengths: Animals, does not like pain, supportive parents. Suicide Thoughts/Plans/Behaviors/Intent: Denies. Discussed safety plan if thoughts were to arise. Assessment of risk/intervention/follow up: Will f/u     Plan:   1. Current Outpatient Prescriptions   Medication Sig Dispense Refill    buPROPion XL (WELLBUTRIN XL) 300 mg XL tablet Take 1 Tab by mouth every morning. 30 Tab 2    escitalopram oxalate (LEXAPRO) 20 mg tablet Take 1 Tab by mouth daily. Indications: Generalized Anxiety Disorder, major depressive disorder 30 Tab 2    melatonin 1 mg/mL liqd Take 5 mg by mouth nightly as needed. 59 mL 1    Biotin 2,500 mcg cap Take  by mouth.  norgestimate-ethinyl estradiol (ORTHO TRI-CYCLEN, TRI-SPRINTEC) 0.18/0.215/0.25 mg-35 mcg (28) tab Take 1 Tab by mouth daily. Indications: DYSMENORRHEA 3 Dose Pack 3    simethicone (GAS-X) 125 mg capsule Take 125 mg by mouth four (4) times daily as needed for Flatulence.             medication changes made today: cont 20mg lexapro depression/anxiety, increase  wellbutrin 300mg XL for aug dep, and cont 5mg melatonin PRN bedtime. 2.  Counseling and coordination of care including instructions for treatment, risks/benefits, risk factor reduction and patient/family education. She agrees with the plan. Patient instructed to call with any side effects, questions or issues. 3.  Follow-up Disposition:  Return in about 2 months (around 12/15/2018).      4. ind therapy- Marlene Shannon     10/15/2018  Teagan Banks NP

## 2018-12-17 ENCOUNTER — OFFICE VISIT (OUTPATIENT)
Dept: BEHAVIORAL/MENTAL HEALTH CLINIC | Age: 18
End: 2018-12-17

## 2018-12-17 VITALS
WEIGHT: 106 LBS | HEART RATE: 97 BPM | DIASTOLIC BLOOD PRESSURE: 55 MMHG | SYSTOLIC BLOOD PRESSURE: 95 MMHG | BODY MASS INDEX: 19.51 KG/M2 | HEIGHT: 62 IN

## 2018-12-17 DIAGNOSIS — F41.0 PANIC ATTACKS: ICD-10-CM

## 2018-12-17 DIAGNOSIS — F41.1 GENERALIZED ANXIETY DISORDER: ICD-10-CM

## 2018-12-17 DIAGNOSIS — F33.1 MODERATE EPISODE OF RECURRENT MAJOR DEPRESSIVE DISORDER (HCC): Primary | ICD-10-CM

## 2018-12-17 RX ORDER — POLYETHYLENE GLYCOL 3350 17 G/17G
17 POWDER, FOR SOLUTION ORAL DAILY
COMMUNITY

## 2018-12-17 RX ORDER — BUPROPION HYDROCHLORIDE 450 MG/1
450 TABLET, FILM COATED, EXTENDED RELEASE ORAL
Qty: 30 TAB | Refills: 1 | Status: SHIPPED | OUTPATIENT
Start: 2018-12-17 | End: 2018-12-18 | Stop reason: SDUPTHER

## 2018-12-17 NOTE — PROGRESS NOTES
CHIEF COMPLAINT:  Jessica Sena is a 19 y.o. female and was seen today for follow-up of psychiatric condition and psychotropic medication management. HPI:    HPI     Rosangela Pierce reports the following psychiatric symptoms:  depression, agitation and anxiety. The symptoms have been present for years and are of mild/moderate severity. The depression symptoms are daily and anxiety symptoms are intermittent. Pt denies any recent panic attacks. Pt reports symptoms have decreased with current tx plan. Pt is here today to discuss her tx plan    Pts score on the PHQ scale was a 13. This indicates moderate depression. Previous PHQ was 15 on 10/15/18. FAMILY/SOCIAL HX: Mother is having a lot of migraines recently. REVIEW OF SYSTEMS:  ROS    Psychiatric:  anxiety  Appetite:no change from normal - Lost 1 lb since last appointment on 10/15/18. Sleep: does not feel rested- \"sometimes I want to sleep all the time and sometimes I can't sleep\". Benefits with melatonin when needed. Neuro: Denies    Visit Vitals  BP 95/55   Pulse 97   Ht 5' 2\" (1.575 m)   Wt 48.1 kg (106 lb)   BMI 19.39 kg/m²       Side Effects:  yawning (celexa) and GI upset (wellbutrin)    MENTAL STATUS EXAM:   Sensorium  oriented to time, place and person   Relations cooperative   Appearance:  casually dressed   Motor Behavior:  within normal limits   Speech:  normal pitch and normal volume   Thought Process: goal directed   Thought Content free of delusions and free of hallucinations   Suicidal ideations no plan  and no intention   Homicidal ideations no plan  and no intention   Mood:  anxious   Affect:  anxious   Memory recent  adequate   Memory remote:  adequate   Concentration:  adequate   Abstraction:  abstract   Insight:  fair   Reliability fair   Judgment:  fair     MEDICAL DECISION MAKING:  Problems addressed today:    ICD-10-CM ICD-9-CM    1. Moderate episode of recurrent major depressive disorder (HCC) F33.1 296.32    2.  Generalized anxiety disorder F41.1 300.02    3. Panic attacks F41.0 300.01        Assessment:   Rosangela Pierce is responding to treatment. Pt reports benefit with tx plan but still with symptoms. Her PHQ reflects this positive change. Pt reports Wellbutrin caused her nausea, so she is taking it at night. Pt also reports yawing with Celexa but would like to continue this medication. Due to benefit with Wellbutrin, will increase to 450 mg and continue 20 mg of Celexa and melatonin 5mg PRN bedtime. Pt denies that taking Wellbutrin at night has caused her any sleep difficulties. Encouraged pt to take in the daytime if it causes sleep difficulties and to make sure she is eating food with medication administration. Discussed that I'm leaving the practice and she can f/u with another provider in this clinic and pt agrees. Provided support and encouragement. SAFE-T ASSESSMENT:   Risk Factors: depressive and anxiety symptoms. Protective Factors/strengths: Animals, does not like pain, supportive parents. Suicide Thoughts/Plans/Behaviors/Intent: Denies. Assessment of risk/intervention/follow up: Will f/u     Plan:   1. Current Outpatient Medications   Medication Sig Dispense Refill    polyethylene glycol (MIRALAX) 17 gram/dose powder Take 17 g by mouth daily.  buPROPion  mg Tb24 Take 450 mg by mouth every morning. 30 Tab 1    escitalopram oxalate (LEXAPRO) 20 mg tablet Take 1 Tab by mouth daily. Indications: Generalized Anxiety Disorder, major depressive disorder 30 Tab 2    melatonin 1 mg/mL liqd Take 5 mg by mouth nightly as needed. 59 mL 1    Biotin 2,500 mcg cap Take  by mouth.  norgestimate-ethinyl estradiol (ORTHO TRI-CYCLEN, TRI-SPRINTEC) 0.18/0.215/0.25 mg-35 mcg (28) tab Take 1 Tab by mouth daily. Indications: DYSMENORRHEA 3 Dose Pack 3    simethicone (GAS-X) 125 mg capsule Take 125 mg by mouth four (4) times daily as needed for Flatulence.             medication changes made today: cont 20mg lexapro depression/anxiety, increase wellbutrin 450mg XL for aug dep, and cont 5mg melatonin PRN bedtime. 2.  Counseling and coordination of care including instructions for treatment, risks/benefits, risk factor reduction and patient/family education. She agrees with the plan. Patient instructed to call with any side effects, questions or issues. 3.  Follow-up Disposition:  Return in about 5 weeks (around 1/21/2019).     12/17/2018  Mary Brantley NP

## 2018-12-18 RX ORDER — BUPROPION HYDROCHLORIDE 300 MG/1
300 TABLET ORAL
Qty: 30 TAB | Refills: 0 | Status: SHIPPED | OUTPATIENT
Start: 2018-12-18 | End: 2019-01-30 | Stop reason: SDUPTHER

## 2018-12-18 RX ORDER — BUPROPION HYDROCHLORIDE 150 MG/1
150 TABLET ORAL
Qty: 30 TAB | Refills: 1 | Status: SHIPPED | OUTPATIENT
Start: 2018-12-18 | End: 2019-01-30 | Stop reason: SDUPTHER

## 2019-01-30 ENCOUNTER — OFFICE VISIT (OUTPATIENT)
Dept: BEHAVIORAL/MENTAL HEALTH CLINIC | Age: 19
End: 2019-01-30

## 2019-01-30 VITALS
HEART RATE: 99 BPM | HEIGHT: 62 IN | SYSTOLIC BLOOD PRESSURE: 122 MMHG | WEIGHT: 106 LBS | DIASTOLIC BLOOD PRESSURE: 85 MMHG | BODY MASS INDEX: 19.51 KG/M2

## 2019-01-30 DIAGNOSIS — F41.0 PANIC ATTACKS: ICD-10-CM

## 2019-01-30 DIAGNOSIS — F33.0 MILD EPISODE OF RECURRENT MAJOR DEPRESSIVE DISORDER (HCC): Primary | ICD-10-CM

## 2019-01-30 DIAGNOSIS — F41.1 GENERALIZED ANXIETY DISORDER: ICD-10-CM

## 2019-01-30 RX ORDER — BUPROPION HYDROCHLORIDE 300 MG/1
300 TABLET ORAL
Qty: 30 TAB | Refills: 2 | Status: SHIPPED | OUTPATIENT
Start: 2019-01-30 | End: 2019-06-10 | Stop reason: SDUPTHER

## 2019-01-30 RX ORDER — BUPROPION HYDROCHLORIDE 150 MG/1
150 TABLET ORAL
Qty: 30 TAB | Refills: 2 | Status: SHIPPED | OUTPATIENT
Start: 2019-01-30 | End: 2019-06-10 | Stop reason: ALTCHOICE

## 2019-01-30 RX ORDER — ESCITALOPRAM OXALATE 20 MG/1
20 TABLET ORAL DAILY
Qty: 30 TAB | Refills: 2 | Status: SHIPPED | OUTPATIENT
Start: 2019-01-30 | End: 2019-06-10 | Stop reason: SDUPTHER

## 2019-01-30 NOTE — PROGRESS NOTES
CHIEF COMPLAINT:  Daylin Seals is a 19 y.o. female and was seen today for follow-up of psychiatric condition and psychotropic medication management. HPI:    REAGAN Lyman reports the following psychiatric symptoms:  depression, agitation and anxiety.  The symptoms have been present for years and are of mild severity. Pt reports symptoms are intermittent now. Pt denies any recent panic attacks. Pt reports less fatigue with current tx plan. Pt reports symptoms have decreased with current tx plan. Pt is here today to discuss her tx plan    Pts score on the PHQ scale was a 12. This indicates moderate depression. FAMILY/SOCIAL HX: Quit her job and got a new job. REVIEW OF SYSTEMS:  ROS    Psychiatric:  normal  Appetite:no change from normal -no weight gain or weight loss. Sleep: improved slightly. Pt reports feeling less fatigued. Neuro: HA's at times     Visit Vitals  /85   Pulse 99   Ht 5' 2\" (1.575 m)   Wt 48.1 kg (106 lb)   BMI 19.39 kg/m²       Side Effects:  headache     MENTAL STATUS EXAM:   Sensorium  oriented to time, place and person   Relations cooperative   Appearance:  casually dressed   Motor Behavior:  within normal limits   Speech:  normal pitch and normal volume   Thought Process: goal directed   Thought Content free of delusions and free of hallucinations   Suicidal ideations no plan  and no intention   Homicidal ideations no plan  and no intention   Mood:  stable   Affect:  stable   Memory recent  adequate   Memory remote:  adequate   Concentration:  adequate   Abstraction:  abstract   Insight:  fair   Reliability fair   Judgment:  fair     MEDICAL DECISION MAKING:  Problems addressed today:    ICD-10-CM ICD-9-CM    1. Mild episode of recurrent major depressive disorder (HCC) F33.0 296.31    2. Generalized anxiety disorder F41.1 300.02    3. Panic attacks F41.0 300.01        Assessment:   Ar Jaeger is responding to treatment.  Pt reports overall great benefit from current tx plan and would like to continue current tx plan. Pt is on her 7th visit with ind therapy and is going to schedule an appt for [address] [place] like we had discussed in past appts. She has to wait until she has 10 therapy sessions per insurance requirements. Pt reports motivation is still difficult and wondering if she has ADHD. Discussed how depression and anxiety symptoms can often mimic ADHD symptoms. Discussed that it would be helpful to clarify dx and get neuro psych testing. Gave pt resources to set up an appt for neuro psych testing. Provided support and encouragement. SAFE-T ASSESSMENT:   Risk Factors: depressive and anxiety symptoms. Protective Factors/strengths: Animals, does not like pain, supportive parents. Suicide Thoughts/Plans/Behaviors/Intent: Denies.   Assessment of risk/intervention/follow up: Will f/u     Plan:   1. Current Outpatient Medications   Medication Sig Dispense Refill    buPROPion XL (WELLBUTRIN XL) 300 mg XL tablet Take 1 Tab by mouth every morning. With 150mg for a total of 450mg 30 Tab 2    buPROPion XL (WELLBUTRIN XL) 150 mg tablet Take 1 Tab by mouth every morning. With 300mg for a total of 450mg 30 Tab 2    escitalopram oxalate (LEXAPRO) 20 mg tablet Take 1 Tab by mouth daily. 30 Tab 2    polyethylene glycol (MIRALAX) 17 gram/dose powder Take 17 g by mouth daily.  melatonin 1 mg/mL liqd Take 5 mg by mouth nightly as needed. 59 mL 1    Biotin 2,500 mcg cap Take  by mouth.  norgestimate-ethinyl estradiol (ORTHO TRI-CYCLEN, TRI-SPRINTEC) 0.18/0.215/0.25 mg-35 mcg (28) tab Take 1 Tab by mouth daily. Indications: DYSMENORRHEA 3 Dose Pack 3    simethicone (GAS-X) 125 mg capsule Take 125 mg by mouth four (4) times daily as needed for Flatulence. medication changes made today: cont 20mg lexapro depression/anxiety,cont wellbutrin 450mg XL for aug dep, and cont 5mg melatonin PRN bedtime.      2.   Counseling and coordination of care including instructions for treatment, risks/benefits, risk factor reduction and patient/family education. She agrees with the plan. Patient instructed to call with any side effects, questions or issues. 3.  Follow-up Disposition:  Return in about 3 months (around 4/30/2019).      4. ind therapy- Caleb Arroyo     1/30/2019  Liliana Verma NP

## 2019-06-10 ENCOUNTER — OFFICE VISIT (OUTPATIENT)
Dept: BEHAVIORAL/MENTAL HEALTH CLINIC | Age: 19
End: 2019-06-10

## 2019-06-10 VITALS
HEART RATE: 82 BPM | WEIGHT: 106 LBS | SYSTOLIC BLOOD PRESSURE: 107 MMHG | HEIGHT: 62 IN | BODY MASS INDEX: 19.51 KG/M2 | DIASTOLIC BLOOD PRESSURE: 69 MMHG

## 2019-06-10 DIAGNOSIS — F33.2 SEVERE EPISODE OF RECURRENT MAJOR DEPRESSIVE DISORDER, WITHOUT PSYCHOTIC FEATURES (HCC): Primary | ICD-10-CM

## 2019-06-10 DIAGNOSIS — F41.1 GENERALIZED ANXIETY DISORDER: ICD-10-CM

## 2019-06-10 RX ORDER — ATOMOXETINE 10 MG/1
10 CAPSULE ORAL
Qty: 30 CAP | Refills: 2 | Status: SHIPPED | OUTPATIENT
Start: 2019-06-10 | End: 2019-08-12 | Stop reason: SINTOL

## 2019-06-10 RX ORDER — ESCITALOPRAM OXALATE 20 MG/1
20 TABLET ORAL DAILY
Qty: 30 TAB | Refills: 2 | Status: SHIPPED | OUTPATIENT
Start: 2019-06-10 | End: 2019-08-12 | Stop reason: SDUPTHER

## 2019-06-10 RX ORDER — BUPROPION HYDROCHLORIDE 300 MG/1
300 TABLET ORAL
Qty: 30 TAB | Refills: 2 | Status: SHIPPED | OUTPATIENT
Start: 2019-06-10 | End: 2019-08-12 | Stop reason: SDUPTHER

## 2019-06-10 NOTE — PROGRESS NOTES
Taqueria Yee  2000  19 y.o.  female  [facility]    Chief Complaint   Patient presents with    Depression     4-5 out of 10 on the scale of 1-10 where 10 is worst    Anxiety     Doing well on current medication    Insomnia     Takes melatonin 5 mg approximately twice a month with benefit    Behavioral Problem     Report poor motivation and interests       Kiana:   This is a 23-year-old single never   female with past psychiatric history and treatment of severe depression and anxiety disorder who was patient of nurse practitioner Mary last seen on January 30, 2019 when she was prescribed Wellbutrin  mg daily, Lexapro 20 mg daily, melatonin 5 mg at bedtime as needed for insomnia. She was seen to transfer care and initial appointment but she was seen last year twice in coverage for Mary on Lauren 15, 2018 and July 30, 2018.       Patient presented on time was observed to be  improved with brighter affect. She report compliance with both of her medications but decrease Wellbutrin back to 300 mg since beginning of March due to reported side effect of shaking and dizziness. She is requiring melatonin approximately twice a month which help her with sleep. Her chief complaint today is poor motivation and interest which is not improved despite of improvement with other parameters of depression. Patient report 4-5 out of 10 on the scale of 1-10 where 10 is worst for depression, it was 10 out of 10 before starting her current medications. She denies any social change, lives with her mother and works in [employer]. She has a boyfriend for past 1 year and is in a stable relationship. She was provided with a lot of support and psychoeducation and after discussing risk and benefit she decided to continue Lexapro 20 mg and Wellbutrin  mg daily and will take melatonin 5 mg only as needed for initial insomnia.   She decided to try atomoxetine 10 mg daily and will let me know in 4 weeks if she would like to go up on the dose and return in 2 months for evaluation. She agreed to go to ED in case of emergency.     SUBSTANCE USE HISTORY:   TOBACCO: Never smoke. ALCOHOL: Drink very infrequently will be couple of times a year and denies any abuse. CANNABIS: Smoked marijuana recreational basis up until 6 months ago. COCAINE, OPIOIDS, and OTHERS: Patient denies. MEDICAL HISTORY:    has a past medical history of Contact dermatitis and other eczema, due to unspecified cause, Depression, Gastroparesis, and GERD (gastroesophageal reflux disease). She also has no past medical history of Anemia, Arrhythmia, Arthritis, Asthma, Autoimmune disease ([address] CAD (coronary artery disease), Calculus of kidney, Cancer ([address] Chronic kidney disease, Chronic obstructive pulmonary disease ([address] Chronic pain, Congestive heart failure ([address] Diabetes ([address] Headache, Hypercholesterolemia, Hypertension, Liver disease, Psychotic disorder ([address] PUD (peptic ulcer disease), Seizures ([address] Stroke ([address] Thromboembolus ([address] Thyroid disease, or Trauma. ALLERGIES:   Allergies   Allergen Reactions    Peanut Anaphylaxis       VITAL SIGNS:  /69   Pulse 82   Ht 5' 2\" (1.575 m)   Wt 48.1 kg (106 lb)   BMI 19.39 kg/m²     Current Outpatient Medications   Medication Sig Dispense Refill    atomoxetine (STRATTERA) 10 mg capsule Take 1 Cap by mouth every morning. Indications: Attention Deficit Disorder with Hyperactivity 30 Cap 2    buPROPion XL (WELLBUTRIN XL) 300 mg XL tablet Take 1 Tab by mouth every morning. With 150mg for a total of 450mg 30 Tab 2    escitalopram oxalate (LEXAPRO) 20 mg tablet Take 1 Tab by mouth daily. Indications: Repeated Episodes of Anxiety, major depressive disorder 30 Tab 2    polyethylene glycol (MIRALAX) 17 gram/dose powder Take 17 g by mouth daily.  melatonin 1 mg/mL liqd Take 5 mg by mouth nightly as needed.  59 mL 1    simethicone (GAS-X) 125 mg capsule Take 125 mg by mouth four (4) times daily as needed for Flatulence.  Biotin 2,500 mcg cap Take  by mouth.  norgestimate-ethinyl estradiol (ORTHO TRI-CYCLEN, TRI-SPRINTEC) 0.18/0.215/0.25 mg-35 mcg (28) tab Take 1 Tab by mouth daily. Indications: DYSMENORRHEA 3 Dose Pack 3       ROS:  Constitutional: Positive for fatigue   Eyes: Negative for contacts/glasses  ENT: Negative for hearing loss and tinnitus  Respiratory: Negative for cough or wheezing  Cardiovascular: Negative for chest pain, palpitations  Neurological: Positive for memory problems  Behavioral/psych: Positive for insomnia, anxiety, depression, negative for SI/HI  Endocrine: Negative for diabetic symptoms including polyuria and polydipsia    MENTAL STATUS EXAMINATION:   Patient is a 19 y.o. female [facility] who looks  slightly younger than her stated age. She had short hair, appropriately dressed with good hygiene. Speech is regular rate and rhythm, fluent language, and thought process is linear, logical, and goal directed. Reported mood is betterwith mood congruent brighter affect. Patient denies any suicidal ideation, homicidal ideation, and auditory visual hallucination. Not observed to be delusional or paranoid. Insight, judgement, reliability and impulse control is intact her cognition is within normal limit and she is observed to be reliable. DIAGNOSIS:  Encounter Diagnoses   Name Primary?  Severe episode of recurrent major depressive disorder, without psychotic features ([facility] [address] [facility] Yes    Generalized anxiety disorder        PLAN:  1. MEDICATION:   1. Depression: Wellbutrin  mg daily. She was not able to tolerate 450 mg dose as agreed during her last appointment on January 30, 2019. She is taking Wellbutrin  mg daily since March and report satisfaction. 2.  Depression and anxiety: Lexapro 20 mg at bedtime. 3.  Initial insomnia: Melatonin 5 mg at bedtime as needed for initial insomnia.   She requires approximately twice a month with benefit. 4.  ADHD symptoms: Atomoxetine 10 mg daily for 4 weeks then she may call me so we can increase to 18 mg daily. Patient was provided with psycho education, discussed risk/benefits, and expectations from medication changes. Patient agrees with plan.      2. PSYCHOTHERAPY: Patient was provided with supportive therapy, strongly encourage to seek psychotherapy. She is receiving regular psychotherapy.     3. MEDICAL CARE: Patient was strongly encourage to take their medical medications and follow up with their PCP on regular basis. her weight is a stable at 106 pounds, blood pressure is 107/69 and pulse is 82. She has history of gastroparesis, acne, constipation, contact dermatitis, and dysmenorrhea in adolescence.       4. SUBSTANCE ABUSE CARE: Patient denies a smoking, drinking, abusing any drugs.     5. FOLLOW UP:   Follow-up and Dispositions    · Return in about 2 months (around 8/10/2019) for Medication management.

## 2019-08-12 ENCOUNTER — OFFICE VISIT (OUTPATIENT)
Dept: BEHAVIORAL/MENTAL HEALTH CLINIC | Age: 19
End: 2019-08-12

## 2019-08-12 VITALS
SYSTOLIC BLOOD PRESSURE: 100 MMHG | HEART RATE: 97 BPM | BODY MASS INDEX: 19.69 KG/M2 | WEIGHT: 107 LBS | DIASTOLIC BLOOD PRESSURE: 63 MMHG | HEIGHT: 62 IN

## 2019-08-12 DIAGNOSIS — F33.1 MODERATE EPISODE OF RECURRENT MAJOR DEPRESSIVE DISORDER (HCC): Primary | ICD-10-CM

## 2019-08-12 DIAGNOSIS — F41.1 GENERALIZED ANXIETY DISORDER: ICD-10-CM

## 2019-08-12 RX ORDER — BUPROPION HYDROCHLORIDE 300 MG/1
300 TABLET ORAL
Qty: 90 TAB | Refills: 1 | Status: SHIPPED | OUTPATIENT
Start: 2019-08-12 | End: 2019-11-20 | Stop reason: SDUPTHER

## 2019-08-12 RX ORDER — ESCITALOPRAM OXALATE 20 MG/1
20 TABLET ORAL DAILY
Qty: 90 TAB | Refills: 1 | Status: SHIPPED | OUTPATIENT
Start: 2019-08-12 | End: 2019-11-20 | Stop reason: SDUPTHER

## 2019-08-12 NOTE — PROGRESS NOTES
Bernarda Cuba  2000  19 y.o.  female  [facility]    Chief Complaint   Patient presents with    Depression     PHQ 9 is score 7 indicating mild depression    Anxiety     Kendrick anxiety rating scale score 20 indicating mild to moderate anxiety    Behavioral Problem     She was not able to tolerate atomoxetine and stopped taking    Medication Problem     Atomoxetine give her side effect of dizziness and shakiness       [place]:   This is a 78-year-old single never   female with past psychiatric history and treatment of severe depression and anxiety disorder who was patient of nurse practitioner Mary last seen on January 30, 2019 when she was prescribed Wellbutrin  mg daily, Lexapro 20 mg daily, melatonin 5 mg at bedtime as needed for insomnia. She was seen to transfer care and initial appointment but she was seen last year twice in coverage for Psychiatric Lauren 15, 2018 and July 30, 2018.       Patient was seen last time on Lauren 10, 2019 when she decided to continue Wellbutrin  mg daily, Lexapro 20 mg at bedtime, melatonin 5 mg at bedtime as needed for initial insomnia, and try 10 mg daily for her complaint of ADHD and return in 2 months for evaluation. She presented on time was observed to be similar to her last visit and she report compliance with both Wellbutrin and Lexapro and only requires melatonin approximately once a week with benefit but the stop taking atomoxetine due to side effect of dizziness and shakiness. Patient report mild depression with PHQ 9 is score 7 and mild to moderate anxiety with Kendrick anxiety rating scale is score 20. She also informed that she is now receiving [address] [place] therapy for treatment resistant depression and will complete the treatment and will let me know if I need to make any changes with the medication at that time. 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 She is in a stable relationship for past year and was provided with support and psychoeducation. She agreed to go to ED in case of emergency.     SUBSTANCE USE HISTORY:   TOBACCO: Never smoke.     ALCOHOL: Drink very infrequently will be couple of times a year and denies any abuse.     CANNABIS: Smoked marijuana recreational basis up until 6 months ago.     COCAINE, OPIOIDS, and OTHERS: Patient denies. MEDICAL HISTORY:    has a past medical history of Contact dermatitis and other eczema, due to unspecified cause, Depression, Gastroparesis, and GERD (gastroesophageal reflux disease). She also has no past medical history of Anemia, Arrhythmia, Arthritis, Asthma, Autoimmune disease ([address] CAD (coronary artery disease), Calculus of kidney, Cancer ([address] Chronic kidney disease, Chronic obstructive pulmonary disease ([address] Chronic pain, Congestive heart failure ([address] Diabetes ([address] Headache, Hypercholesterolemia, Hypertension, Liver disease, Psychotic disorder ([address] PUD (peptic ulcer disease), Seizures ([address] Stroke ([address] Thromboembolus ([address] Thyroid disease, or Trauma. ALLERGIES:   Allergies   Allergen Reactions    Peanut Anaphylaxis       VITAL SIGNS:  /63   Pulse 97   Ht 5' 2\" (1.575 m)   Wt 48.5 kg (107 lb)   BMI 19.57 kg/m²     Current Outpatient Medications   Medication Sig Dispense Refill    buPROPion XL (WELLBUTRIN XL) 300 mg XL tablet Take 1 Tab by mouth every morning. With 150mg for a total of 450mg 90 Tab 1    escitalopram oxalate (LEXAPRO) 20 mg tablet Take 1 Tab by mouth daily. Indications: Repeated Episodes of Anxiety, major depressive disorder 90 Tab 1    polyethylene glycol (MIRALAX) 17 gram/dose powder Take 17 g by mouth daily.  melatonin 1 mg/mL liqd Take 5 mg by mouth nightly as needed. 59 mL 1    Biotin 2,500 mcg cap Take  by mouth.  norgestimate-ethinyl estradiol (ORTHO TRI-CYCLEN, TRI-SPRINTEC) 0.18/0.215/0.25 mg-35 mcg (28) tab Take 1 Tab by mouth daily. Indications: DYSMENORRHEA 3 Dose Pack 3    simethicone (GAS-X) 125 mg capsule Take 125 mg by mouth four (4) times daily as needed for Flatulence. ROS:  Constitutional: Positive for fatigue   Respiratory: Negative for cough or wheezing  Cardiovascular: Negative for chest pain, palpitations  Neurological: Positive for memory problems  Behavioral/psych: Positive for insomnia, anxiety, depression, negative for SI/HI  Endocrine: Negative for diabetic symptoms including polyuria and polydipsia     MENTAL STATUS EXAMINATION:   Patient is a 18 y.o. female [place]ITE [place]  who looks  slightly younger than her stated age. She had short hair, appropriately dressed with good hygiene. Speech is regular rate and rhythm, fluent language, and thought process is linear, logical, and goal directed. Reported mood is betterwith mood congruent brighter affect. Patient denies any suicidal ideation, homicidal ideation, and auditory visual hallucination. Not observed to be delusional or paranoid. Insight, judgement, reliability and impulse control is intact her cognition is within normal limit and she is observed to be reliable. DIAGNOSIS:  Encounter Diagnoses   Name Primary?  Moderate episode of recurrent major depressive disorder (HCC) Yes    Generalized anxiety disorder        PLAN:  1. MEDICATION:   1. Depression: Wellbutrin  mg daily. She was not able to tolerate 450 mg dose as agreed during her last appointment on January 30, 2019. She is taking Wellbutrin  mg daily since March and report satisfaction. She started to have [address] [place] therapy at [facility] for treatment resistant depression.     2. Depression and anxiety: Lexapro 20 mg at bedtime.     3. Initial insomnia: Melatonin 5 mg at bedtime as needed for initial insomnia. She requires approximately 4 times a month with benefit.       4.   ADHD symptoms:  Discontinue atomoxetine, she is stopped taking and reported side effect of shaking and dizziness. Patient was provided with psycho education, discussed risk/benefits, and expectations from medication changes. Patient agrees with plan.      2. PSYCHOTHERAPY: Patient was provided with supportive therapy, strongly encourage to seek psychotherapy. She is receiving regular psychotherapy.     3. MEDICAL CARE: Patient was strongly encourage to take their medical medications and follow up with their PCP on regular basis. her weight is a stable at 107 pounds, blood pressure is 100/69 and pulse is 97. She has history of gastroparesis, acne, constipation, contact dermatitis, and dysmenorrhea in adolescence.       4. SUBSTANCE ABUSE CARE: Patient denies a smoking, drinking, abusing any drugs. 5. FOLLOW UP:   Follow-up and Dispositions    · Return in about 3 months (around 11/12/2019) for Medication management.

## 2019-11-20 ENCOUNTER — OFFICE VISIT (OUTPATIENT)
Dept: BEHAVIORAL/MENTAL HEALTH CLINIC | Age: 19
End: 2019-11-20

## 2019-11-20 VITALS
BODY MASS INDEX: 19.51 KG/M2 | DIASTOLIC BLOOD PRESSURE: 79 MMHG | HEIGHT: 62 IN | WEIGHT: 106 LBS | SYSTOLIC BLOOD PRESSURE: 107 MMHG | HEART RATE: 102 BPM

## 2019-11-20 DIAGNOSIS — F33.1 MODERATE EPISODE OF RECURRENT MAJOR DEPRESSIVE DISORDER (HCC): Primary | ICD-10-CM

## 2019-11-20 DIAGNOSIS — F41.1 GENERALIZED ANXIETY DISORDER: ICD-10-CM

## 2019-11-20 RX ORDER — ESCITALOPRAM OXALATE 20 MG/1
20 TABLET ORAL DAILY
Qty: 90 TAB | Refills: 2 | Status: SHIPPED | OUTPATIENT
Start: 2019-11-20 | End: 2020-07-14 | Stop reason: SDUPTHER

## 2019-11-20 RX ORDER — BUPROPION HYDROCHLORIDE 300 MG/1
300 TABLET ORAL
Qty: 90 TAB | Refills: 2 | Status: SHIPPED | OUTPATIENT
Start: 2019-11-20 | End: 2020-07-14 | Stop reason: SDUPTHER

## 2019-11-20 NOTE — PROGRESS NOTES
Manuel Redding  2000  22 y.o.  female  [facility]    Chief Complaint   Patient presents with    Depression     Patient report 70% improvement with [address] [place]    Anxiety     Lexapro helps    Insomnia     She is taking melatonin approximately once a week with benefit    Stress     Denies psychosocial stress       [place]:   This is a 16-year-old single never   female with past psychiatric history and treatment of severe depression and anxiety disorder who was patient of nurse practitioner Mary last seen on January 30, 2019 when she was prescribed Wellbutrin  mg daily, Lexapro 20 mg daily, melatonin 5 mg at bedtime as needed for insomnia. She was seen to transfer care and initial appointment but she was seen last year twice in coverage for Mary on Lauren 15, 2018 and July 30, 2018.       Patient was seen last time on August 12, 2019 when she decided to continue Wellbutrin  mg daily, Lexapro 20 mg at bedtime, and melatonin 5 mg at bedtime as needed for initial insomnia and return in 3 months for evaluation. She presented on time was observed to be  improved with brighter affect and report compliance with both Wellbutrin and Lexapro and only requires melatonin approximately once a week with benefit.      Patient report 70% with her depression and anxiety since completed [address] [place] therapy for treatment resistant depression. 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 She is in a stable relationship for past couple of years and was provided with support and psychoeducation and she agreed to continue current treatment plan.  She agreed to go to ED in case of emergency.     SUBSTANCE USE HISTORY:   TOBACCO: Never smoke.     ALCOHOL: Drink very infrequently will be couple of times a year and denies any abuse.     CANNABIS: Smoked marijuana recreational basis up until 6 months ago.     COCAINE, OPIOIDS, and OTHERS: Patient denies. MEDICAL HISTORY:    has a past medical history of Contact dermatitis and other eczema, due to unspecified cause, Depression, Gastroparesis, and GERD (gastroesophageal reflux disease). She also has no past medical history of Anemia, Arrhythmia, Arthritis, Asthma, Autoimmune disease ([facility] CAD (coronary artery disease), Calculus of kidney, Cancer ([facility] Chronic kidney disease, Chronic obstructive pulmonary disease ([facility] Chronic pain, Congestive heart failure ([facility] Diabetes ([facility] Headache, Hypercholesterolemia, Hypertension, Liver disease, Psychotic disorder ([facility] PUD (peptic ulcer disease), Seizures ([facility] Stroke ([facility] Thromboembolus ([facility] Thyroid disease, or Trauma. ALLERGIES:   Allergies   Allergen Reactions    Peanut Anaphylaxis       VITAL SIGNS:  /79 (BP 1 Location: Left arm, BP Patient Position: Sitting)   Pulse (!) 102   Ht 5' 2\" (1.575 m)   Wt 48.1 kg (106 lb)   BMI 19.39 kg/m²     Current Outpatient Medications   Medication Sig Dispense Refill    escitalopram oxalate (LEXAPRO) 20 mg tablet Take 1 Tab by mouth daily. Indications: Repeated Episodes of Anxiety, major depressive disorder 90 Tab 2    buPROPion XL (WELLBUTRIN XL) 300 mg XL tablet Take 1 Tab by mouth every morning. With 150mg for a total of 450mg 90 Tab 2    melatonin 1 mg/mL liqd Take 5 mg by mouth nightly as needed. 59 mL 1    norgestimate-ethinyl estradiol (ORTHO TRI-CYCLEN, TRI-SPRINTEC) 0.18/0.215/0.25 mg-35 mcg (28) tab Take 1 Tab by mouth daily. Indications: DYSMENORRHEA 3 Dose Pack 3    polyethylene glycol (MIRALAX) 17 gram/dose powder Take 17 g by mouth daily.  Biotin 2,500 mcg cap Take  by mouth.  simethicone (GAS-X) 125 mg capsule Take 125 mg by mouth four (4) times daily as needed for Flatulence.          ROS:  Constitutional: Negative for fever  Respiratory: Negative for cough or wheezing  Cardiovascular: Negative for chest pain, palpitations  Neurological: Positive for memory problems  Behavioral/psych: Positive for insomnia, anxiety, depression, negative for SI/HI  Endocrine: Negative for diabetic symptoms including polyuria and polydipsia     MENTAL STATUS EXAMINATION:   Patient is [address] looks slightly younger than her stated age. She had short hair, lots of makeup, appropriately dressed with good hygiene. Speech is regular rate and rhythm, fluent language, and thought process is linear, logical, and goal directed. Reported mood is betterwith mood congruent brighter affect. Patient denies any suicidal ideation, homicidal ideation, and auditory visual hallucination. Not observed to be delusional or paranoid. Insight, judgement, reliability and impulse control is intact her cognition is within normal limit and she is observed to be reliable. DIAGNOSIS:  Encounter Diagnoses   Name Primary?  Moderate episode of recurrent major depressive disorder (HCC) Yes    Generalized anxiety disorder        PLAN:  1. MEDICATION:   1.  Depression: Wellbutrin XL 300 mg daily.  She was not able to tolerate 450 mg dose. She completed [address] [facility] for treatment resistant depression and report 70% benefit with her depression.     2.  Depression and anxiety: Lexapro 20 mg at bedtime.     3.  Initial insomnia: Melatonin 5 mg at bedtime as needed for initial insomnia.  She requires approximately 4 times a month with benefit. She was provided with psycho education, discussed risk/benefits, and expectations from medication changes. Patient agrees with plan.      2. PSYCHOTHERAPY: Patient was provided with supportive therapy, strongly encourage to seek psychotherapy.  She is receiving regular psychotherapy.     3. MEDICAL CARE: Patient was strongly encourage to take their medical medications and follow up with their PCP on regular basis. her weight is a stable at 106 pounds, blood pressure is 107/79 and pulse is 102.  She has history of gastroparesis, acne, constipation, contact dermatitis, and dysmenorrhea in adolescence.       4. SUBSTANCE ABUSE CARE: Patient denies a smoking, drinking, abusing any drugs. 5. FOLLOW UP:   Follow-up and Dispositions    · Return in about 6 months (around 5/20/2020) for Medication management.

## 2020-07-14 ENCOUNTER — VIRTUAL VISIT (OUTPATIENT)
Dept: BEHAVIORAL/MENTAL HEALTH CLINIC | Age: 20
End: 2020-07-14

## 2020-07-14 DIAGNOSIS — F41.8 ANXIETY ASSOCIATED WITH DEPRESSION: ICD-10-CM

## 2020-07-14 DIAGNOSIS — R41.840 ATTENTION DEFICIT: ICD-10-CM

## 2020-07-14 DIAGNOSIS — F33.42 RECURRENT MAJOR DEPRESSIVE DISORDER, IN FULL REMISSION (HCC): Primary | ICD-10-CM

## 2020-07-14 RX ORDER — BUPROPION HYDROCHLORIDE 300 MG/1
300 TABLET ORAL
Qty: 90 TAB | Refills: 2 | Status: SHIPPED | OUTPATIENT
Start: 2020-07-14 | End: 2021-02-23 | Stop reason: SDUPTHER

## 2020-07-14 RX ORDER — ESCITALOPRAM OXALATE 20 MG/1
20 TABLET ORAL DAILY
Qty: 90 TAB | Refills: 2 | Status: SHIPPED | OUTPATIENT
Start: 2020-07-14 | End: 2021-02-23 | Stop reason: SDUPTHER

## 2020-07-14 NOTE — PROGRESS NOTES
HPI: Remy Solomon NP ( Jan, 2018)  Jermain Both reports the following psychiatric symptoms:  depression, agitation and anxiety. The symptoms have been present for years (since 9th-10th grade) and are of moderate/high severity. The symptoms occur constantly. Associated symptoms include  agitation, anger outbursts, anxiety, anxiety attacks, avoidance of crowds, difficulty sleeping, fear of going crazy, fear of impending doom, feeling depressed, feeling suicidal, financial problems, increased irritability, poor concentration, fear of spiders/dark, relationship difficulties, stressed at work, tearfulness, \"I thought I was losing my mind\" and \"I don't want to go on living like this\". Pt reports some hypomania symptoms that are congruent with depressive symptoms including; agitation, increased motivation, increased energy, increased self confidence and pressured speech. Pt reports these symptoms are never isolated and present in bursts. Sleeping and eating unhealthy food helps symptoms. Without sleep pt symptoms are worse. Pt is here today to discuss a tx plan to address symptoms.      Pts score on the PHQ scale was a 24. This indicates severe depression. Pt scored 31 on the HAM-A, which reflects severe anxiety. Pt screened positive on the MDQ.      PAST HISTORY:  Psychiatric:  Past Psychiatric Hospitalization: Denies  Past Outpatient Providers:  Denies  Past Psychiatric Medications: Lexapro, Zoloft, Prozac, Celexa, Effexor. Stayed on each medication for several months without full benefit. Currently on Trintillex 5mg (has been on for a few months).    Medical:       Active Ambulatory Problems     Diagnosis Date Noted    Gastroparesis 07/14/2017    Constipation 07/14/2017    Depression 07/14/2017    Acne 07/14/2017    Dysmenorrhea in adolescent 07/14/2017           Resolved Ambulatory Problems     Diagnosis Date Noted    No Resolved Ambulatory Problems           Past Medical History:   Diagnosis Date    Contact dermatitis and other eczema, due to unspecified cause      Depression      Gastroparesis      GERD (gastroesophageal reflux disease)       Substance Use:   Illicit: Denies   Caffeine: 3 cups a week of soda/tea. Nicotine: Denies  ETOH: Occasionally a couple of sips. Pt reports only taking a couple of sips.      Social History            Social History    Marital status: UNKNOWN       Spouse name: N/A    Number of children: N/A    Years of education: N/A           Social History Main Topics    Smoking status: Never Smoker    Smokeless tobacco: Never Used    Alcohol use No    Drug use: No    Sexual activity: No           Other Topics Concern    None      Social History Narrative     Social:  Marital Status: Single. Children: Denies   Educational Level: Graduated HS. 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 Legal History: Denies   Pertinent Childhood History: Describes childhood as fine. Pt reports she was a happy kid. She reports falling in depression at age 13 after her period and then everything became sour. Grew up with mother and father. Parents  at age 1. Mother had depression so dad was more supportive during childhood. Denies childhood abuse or trauma. Hobbies: video games, drawing, writing, and painting.      Family:  Family history of mental or substance use history reported. Family history of medical problems reviewed. Mother: depression   Maternal Aunt: depression   Maternal grandmother: mental health problems? Stroke     Dru Hassan is a 22 y.o. female who was seen by synchronous (real-time) audio-video technology on 7/14/2020 for New Patient and Follow-up        Assessment & Plan:   Diagnoses and all orders for this visit:    1. Recurrent major depressive disorder, in full remission ([facility]    2. Anxiety associated with depression    3.  Attention deficit  -     REFERRAL TO NEUROPSYCHOLOGY    Other orders  - buPROPion XL (WELLBUTRIN XL) 300 mg XL tablet; Take 1 Tab by mouth every morning. With 150mg for a total of 450mg  -     escitalopram oxalate (LEXAPRO) 20 mg tablet; Take 1 Tab by mouth daily. Indications: repeated episodes of anxiety, major depressive disorder      She believes that she may suffer from ADD because her concentration has not improved despite control of anxiety and depression. Her therapist, Dulce Mcnair told her to get testing,     She is aware of this provider's departure    I spent at least 18 minutes on this visit with this new patient. Subjective:   She did not voice any complaints butr does not feel she is at baseline despite the medications and good control of her depression with anxiety. Her weight is 108, she remains sociable, active,employed with \" Dog boarding company\" for the past 1 year. She lives with her mother and is otherwise healthy. She enjoys playing video games, writing short novels, reading, and drawing. She believes that she may suffer from ADD because her concentration has not improved despite control of anxiety and depression. Her therapist, Dulce Mcnair told her to get testing. She does not have a clear of vision of how her future should be shaped or what course to take. Prior to Admission medications    Medication Sig Start Date End Date Taking? Authorizing Provider   escitalopram oxalate (LEXAPRO) 20 mg tablet Take 1 Tab by mouth daily. Indications: Repeated Episodes of Anxiety, major depressive disorder 11/20/19   Mary Kay Patel MD   buPROPion XL (WELLBUTRIN XL) 300 mg XL tablet Take 1 Tab by mouth every morning. With 150mg for a total of 450mg 11/20/19   Mary Kay Patel MD   polyethylene glycol (MIRALAX) 17 gram/dose powder Take 17 g by mouth daily. Provider, Historical   melatonin 1 mg/mL liqd Take 5 mg by mouth nightly as needed. 10/15/18   Micheal Marquis NP   Biotin 2,500 mcg cap Take  by mouth.     Provider, Historical   norgestimate-ethinyl estradiol (ORTHO TRI-CYCLEN, TRI-SPRINTEC) 0.18/0.215/0.25 mg-35 mcg (28) tab Take 1 Tab by mouth daily. Indications: DYSMENORRHEA 10/6/17   Ayde HILL, NP   simethicone (GAS-X) 125 mg capsule Take 125 mg by mouth four (4) times daily as needed for Flatulence. Provider, Historical         ROS: poor appetite, GI symptoms due to Gastroparesis    Objective:   No flowsheet data found. MSE: Patient was well groomed, calm, collective, w/o any thought or speech disorder. Her affect was full range and she felt fine. She denied any SI/HI. No delusions noted.     [INSTRUCTIONS:  \"[x]\" Indicates a positive item  \"[]\" Indicates a negative item  -- DELETE ALL ITEMS NOT EXAMINED]    Constitutional: [x] Appears well-developed and well-nourished [x] No apparent distress      [] Abnormal -     Mental status: [x] Alert and awake  [x] Oriented to person/place/time [x] Able to follow commands    [] Abnormal -     Eyes:   EOM    [x]  Normal    [] Abnormal -   Sclera  [x]  Normal    [] Abnormal -          Discharge [x]  None visible   [] Abnormal -     HENT: [x] Normocephalic, atraumatic  [] Abnormal -       External Ears [x] Normal  [] Abnormal -    Neck: [x] No visualized mass [] Abnormal -     Pulmonary/Chest: [x] Respiratory effort normal   [x] No visualized signs of difficulty breathing or respiratory distress        [] Abnormal -      Musculoskeletal:           [x] Normal range of motion of neck        [] Abnormal -     Neurological:        [x] No Facial Asymmetry (Cranial nerve 7 motor function) (limited exam due to video visit)          [x] No gaze palsy        [] Abnormal -          Skin:        [x] No significant exanthematous lesions or discoloration noted on facial skin         [] Abnormal -            Psychiatric:       [x] Normal Affect [] Abnormal -        [x] No Hallucinations    Other pertinent observable physical exam findings:-        We discussed the expected course, resolution and complications of the diagnosis(es) in detail. Medication risks, benefits, costs, interactions, and alternatives were discussed as indicated. I advised her to contact the office if her condition worsens, changes or fails to improve as anticipated. She expressed understanding with the diagnosis(es) and plan. Jesus Hammans, who was evaluated through a patient-initiated, synchronous (real-time) audio-video encounter, and/or her healthcare decision maker, is aware that it is a billable service, with coverage as determined by her insurance carrier. She provided verbal consent to proceed: Yes, and patient identification was verified. It was conducted pursuant to the emergency declaration under the [address] [place] [address], [address] [place] [address] authority and the [employer] Act. A caregiver was present when appropriate. Ability to conduct physical exam was limited. I was in the office. The patient was at home.       Bhargavi Mercado MD

## 2021-02-04 ENCOUNTER — OFFICE VISIT (OUTPATIENT)
Dept: OBGYN CLINIC | Age: 21
End: 2021-02-04
Payer: COMMERCIAL

## 2021-02-04 VITALS — DIASTOLIC BLOOD PRESSURE: 64 MMHG | SYSTOLIC BLOOD PRESSURE: 108 MMHG

## 2021-02-04 DIAGNOSIS — Z01.419 ENCOUNTER FOR GYNECOLOGICAL EXAMINATION WITHOUT ABNORMAL FINDING: Primary | ICD-10-CM

## 2021-02-04 PROCEDURE — 99385 PREV VISIT NEW AGE 18-39: CPT | Performed by: OBSTETRICS & GYNECOLOGY

## 2021-02-04 NOTE — PROGRESS NOTES
Annual exam    Monse Grace is a 23 y.o. presenting for annual exam. Her main concerns today include establishing care with a gyn provider. This will be the first gyn exam.    Patient recently suspected she was developing a yeast infection but was able to get relief using an OTC 7-day treatment. Pt has gender dysphoria and identifies as a male, and has questions about hormone therapy and  surgery. Pt accepts a chaperone during the gynecologic exam today.      Ob/Gyn Hx:  G0  LMP - mid January  Menses - mostly regular  Contraception - none  STI - no  SA - never    Health maintenance:  Pap - today  Gardasil -      Past Medical History:   Diagnosis Date    Contact dermatitis and other eczema, due to unspecified cause     Depression     Gastroparesis     GERD (gastroesophageal reflux disease)        Past Surgical History:   Procedure Laterality Date    HX ENDOSCOPY         Family History   Problem Relation Age of Onset    Hypertension Mother     Depression Mother     Stroke Maternal Grandmother     Depression Maternal Grandmother     Ovarian Cancer Maternal Grandmother        Social History     Socioeconomic History    Marital status: UNKNOWN     Spouse name: Not on file    Number of children: Not on file    Years of education: Not on file    Highest education level: Not on file   Occupational History    Not on file   Social Needs    Financial resource strain: Not on file    Food insecurity     Worry: Not on file     Inability: Not on file    Transportation needs     Medical: Not on file     Non-medical: Not on file   Tobacco Use    Smoking status: Never Smoker    Smokeless tobacco: Never Used   Substance and Sexual Activity    Alcohol use: No    Drug use: No    Sexual activity: Never   Lifestyle    Physical activity     Days per week: Not on file     Minutes per session: Not on file    Stress: Not on file   Relationships    Social connections     Talks on phone: Not on file Gets together: Not on file     Attends Hindu service: Not on file     Active member of club or organization: Not on file     Attends meetings of clubs or organizations: Not on file     Relationship status: Not on file    Intimate partner violence     Fear of current or ex partner: Not on file     Emotionally abused: Not on file     Physically abused: Not on file     Forced sexual activity: Not on file   Other Topics Concern    Not on file   Social History Narrative    Not on file       Current Outpatient Medications   Medication Sig Dispense Refill    buPROPion XL (WELLBUTRIN XL) 300 mg XL tablet Take 1 Tab by mouth every morning. With 150mg for a total of 450mg 90 Tab 2    escitalopram oxalate (LEXAPRO) 20 mg tablet Take 1 Tab by mouth daily. Indications: repeated episodes of anxiety, major depressive disorder 90 Tab 2    polyethylene glycol (MIRALAX) 17 gram/dose powder Take 17 g by mouth daily.  melatonin 1 mg/mL liqd Take 5 mg by mouth nightly as needed. 59 mL 1    Biotin 2,500 mcg cap Take  by mouth.  simethicone (GAS-X) 125 mg capsule Take 125 mg by mouth four (4) times daily as needed for Flatulence.          Allergies   Allergen Reactions    Peanut Anaphylaxis       Review of Systems - History obtained from the patient  Constitutional: negative for weight loss, fever, night sweats  HEENT: negative for hearing loss, earache, congestion, snoring, sorethroat  CV: negative for chest pain, palpitations, edema  Resp: negative for cough, shortness of breath, wheezing  GI: negative for change in bowel habits, abdominal pain, black or bloody stools  : negative for frequency, dysuria, hematuria, vaginal discharge  MSK: negative for back pain, joint pain, muscle pain  Breast: negative for breast lumps, nipple discharge, galactorrhea  Skin :negative for itching, rash, hives  Neuro: negative for dizziness, headache, confusion, weakness  Psych: negative for anxiety, depression, change in mood  Heme/lymph: negative for bleeding, bruising, pallor    Physical Exam    Visit Vitals  /64 (BP 1 Location: Left upper arm)       Constitutional  · Appearance: well-nourished, well developed, alert, in no acute distress    HENT  · Head and Face: appears normal    Neck  · Inspection/Palpation: normal appearance, no masses or tenderness  · Lymph Nodes: no lymphadenopathy present  · Thyroid: gland size normal, nontender, no nodules or masses present on palpation    Chest  · Respiratory Effort: non-labored breathing  · Auscultation: CTAB, normal breath sounds    Cardiovascular  · Heart:  · Auscultation: regular rate and rhythm without murmur  · Extremities: no peripheral edema    Breasts  · Inspection of Breasts: breasts symmetrical, no skin changes, no discharge present, nipple appearance normal, no skin retraction present  · Palpation of Breasts and Axillae: no masses present on palpation, no breast tenderness  · Axillary Lymph Nodes: no lymphadenopathy present    Gastrointestinal  · Abdominal Examination: abdomen non-tender to palpation, normal bowel sounds, no masses present  · Liver and spleen: no hepatomegaly present, spleen not palpable  · Hernias: no hernias identified    Genitourinary  · External Genitalia: normal appearance for age, no discharge present, no tenderness present, no inflammatory lesions present, no masses present, no atrophy present  · Vagina: normal vaginal vault without central or paravaginal defects, no discharge present, no inflammatory lesions present, no masses present  · Bladder: non-tender to palpation  · Urethra: appears normal  · Cervix: normal   · Uterus: normal size, shape and consistency  · Adnexa: no adnexal tenderness present, no adnexal masses present  · Perineum: perineum within normal limits, no evidence of trauma, no rashes or skin lesions present    Skin  · General Inspection: no rash, no lesions identified    Neurologic/Psychiatric  · Mental Status:  · Orientation: grossly oriented to person, place and time  · Mood and Affect: mood normal, affect appropriate      Assessment/Plan:  23 y.o. presenting for annual exam. Overall doing well. Well woman exam:  Normal gynecologic and breast exams. Healthy habits and lifestyle reviewed. Pap with HPV reflex performed today. Patient declines STD screening. Contraception and menstrual regulation - patient opts for none. We discussed providers to see for hormone therapy and names given. Advised with testosterone therapy, menses will often cease.        Radha Carrillo MD

## 2021-02-04 NOTE — PATIENT INSTRUCTIONS
Well Visit, Ages 25 to 48: Care Instructions  Your Care Instructions     Physical exams can help you stay healthy. Your doctor has checked your overall health and may have suggested ways to take good care of yourself. He or she also may have recommended tests. At home, you can help prevent illness with healthy eating, regular exercise, and other steps. Follow-up care is a key part of your treatment and safety. Be sure to make and go to all appointments, and call your doctor if you are having problems. It's also a good idea to know your test results and keep a list of the medicines you take. How can you care for yourself at home? · Reach and stay at a healthy weight. This will lower your risk for many problems, such as obesity, diabetes, heart disease, and high blood pressure. · Get at least 30 minutes of physical activity on most days of the week. Walking is a good choice. You also may want to do other activities, such as running, swimming, cycling, or playing tennis or team sports. Discuss any changes in your exercise program with your doctor. · Do not smoke or allow others to smoke around you. If you need help quitting, talk to your doctor about stop-smoking programs and medicines. These can increase your chances of quitting for good. · Talk to your doctor about whether you have any risk factors for sexually transmitted infections (STIs). Having one sex partner (who does not have STIs and does not have sex with anyone else) is a good way to avoid these infections. · Use birth control if you do not want to have children at this time. Talk with your doctor about the choices available and what might be best for you. · Protect your skin from too much sun. When you're outdoors from 10 a.m. to 4 p.m., stay in the shade or cover up with clothing and a hat with a wide brim. Wear sunglasses that block UV rays. Even when it's cloudy, put broad-spectrum sunscreen (SPF 30 or higher) on any exposed skin.   · See a dentist one or two times a year for checkups and to have your teeth cleaned. · Wear a seat belt in the car. Follow your doctor's advice about when to have certain tests. These tests can spot problems early. For everyone  · Cholesterol. Have the fat (cholesterol) in your blood tested after age 21. Your doctor will tell you how often to have this done based on your age, family history, or other things that can increase your risk for heart disease. · Blood pressure. Have your blood pressure checked during a routine doctor visit. Your doctor will tell you how often to check your blood pressure based on your age, your blood pressure results, and other factors. · Vision. Talk with your doctor about how often to have a glaucoma test.  · Diabetes. Ask your doctor whether you should have tests for diabetes. · Colon cancer. Your risk for colorectal cancer gets higher as you get older. Some experts say that adults should start regular screening at age 48 and stop at age 76. Others say to start before age 48 or continue after age 76. Talk with your doctor about your risk and when to start and stop screening. For women  · Breast exam and mammogram. Talk to your doctor about when you should have a clinical breast exam and a mammogram. Medical experts differ on whether and how often women under 50 should have these tests. Your doctor can help you decide what is right for you. · Cervical cancer screening test and pelvic exam. Begin with a Pap test at age 24. The test often is part of a pelvic exam. Starting at age 27, you may choose to have a Pap test, an HPV test, or both tests at the same time (called co-testing). Talk with your doctor about how often to have testing. · Tests for sexually transmitted infections (STIs). Ask whether you should have tests for STIs. You may be at risk if you have sex with more than one person, especially if your partners do not wear condoms.   For men  · Tests for sexually transmitted infections (STIs). Ask whether you should have tests for STIs. You may be at risk if you have sex with more than one person, especially if you do not wear a condom. · Testicular cancer exam. Ask your doctor whether you should check your testicles regularly. · Prostate exam. Talk to your doctor about whether you should have a blood test (called a PSA test) for prostate cancer. Experts differ on whether and when men should have this test. Some experts suggest it if you are older than 39 and are -American or have a father or brother who got prostate cancer when he was younger than 72. When should you call for help? Watch closely for changes in your health, and be sure to contact your doctor if you have any problems or symptoms that concern you. Where can you learn more? Go to http://www.morris.com/  Enter P072 in the search box to learn more about \"Well Visit, Ages 25 to 48: Care Instructions. \"  Current as of: May 27, 2020               Content Version: 12.6  © 2006-2020 [employer]. Care instructions adapted under license by [employer] (which disclaims liability or warranty for this information). If you have questions about a medical condition or this instruction, always ask your healthcare professional. [place] [address] any warranty or liability for your use of this information.

## 2021-02-05 LAB
CYTOLOGIST CVX/VAG CYTO: NORMAL
CYTOLOGY CVX/VAG DOC CYTO: NORMAL
CYTOLOGY CVX/VAG DOC THIN PREP: NORMAL
DX ICD CODE: NORMAL
LABCORP, 190119: NORMAL
Lab: NORMAL
OTHER STN SPEC: NORMAL
STAT OF ADQ CVX/VAG CYTO-IMP: NORMAL

## 2021-02-23 ENCOUNTER — OFFICE VISIT (OUTPATIENT)
Dept: INTERNAL MEDICINE CLINIC | Age: 21
End: 2021-02-23
Payer: COMMERCIAL

## 2021-02-23 VITALS
RESPIRATION RATE: 16 BRPM | HEIGHT: 62 IN | BODY MASS INDEX: 20.76 KG/M2 | TEMPERATURE: 98.2 F | DIASTOLIC BLOOD PRESSURE: 81 MMHG | HEART RATE: 74 BPM | WEIGHT: 112.8 LBS | OXYGEN SATURATION: 97 % | SYSTOLIC BLOOD PRESSURE: 111 MMHG

## 2021-02-23 DIAGNOSIS — F32.A ANXIETY AND DEPRESSION: ICD-10-CM

## 2021-02-23 DIAGNOSIS — Z00.00 PHYSICAL EXAM, ANNUAL: Primary | ICD-10-CM

## 2021-02-23 DIAGNOSIS — F90.9 ATTENTION DEFICIT HYPERACTIVITY DISORDER (ADHD), UNSPECIFIED ADHD TYPE: ICD-10-CM

## 2021-02-23 DIAGNOSIS — F41.9 ANXIETY AND DEPRESSION: ICD-10-CM

## 2021-02-23 DIAGNOSIS — Z23 NEEDS FLU SHOT: ICD-10-CM

## 2021-02-23 LAB
BILIRUB UR QL STRIP: NEGATIVE
GLUCOSE UR-MCNC: NEGATIVE MG/DL
KETONES P FAST UR STRIP-MCNC: NEGATIVE MG/DL
PH UR STRIP: 7 [PH] (ref 4.6–8)
PROT UR QL STRIP: NEGATIVE
SP GR UR STRIP: 1.02 (ref 1–1.03)
UA UROBILINOGEN AMB POC: NORMAL (ref 0.2–1)
URINALYSIS CLARITY POC: CLEAR
URINALYSIS COLOR POC: YELLOW
URINE BLOOD POC: NORMAL
URINE LEUKOCYTES POC: NEGATIVE
URINE NITRITES POC: NEGATIVE

## 2021-02-23 PROCEDURE — 99385 PREV VISIT NEW AGE 18-39: CPT | Performed by: NURSE PRACTITIONER

## 2021-02-23 PROCEDURE — 81003 URINALYSIS AUTO W/O SCOPE: CPT | Performed by: NURSE PRACTITIONER

## 2021-02-23 RX ORDER — ESCITALOPRAM OXALATE 20 MG/1
20 TABLET ORAL DAILY
Qty: 90 TAB | Refills: 2 | Status: SHIPPED | OUTPATIENT
Start: 2021-02-23 | End: 2022-10-08

## 2021-02-23 RX ORDER — BUPROPION HYDROCHLORIDE 300 MG/1
300 TABLET ORAL
Qty: 90 TAB | Refills: 2 | Status: SHIPPED | OUTPATIENT
Start: 2021-02-23 | End: 2022-10-08

## 2021-02-23 NOTE — PATIENT INSTRUCTIONS
Attention Deficit Hyperactivity Disorder (ADHD) in Adults: Care Instructions  Your Care Instructions     Attention deficit hyperactivity disorder, or ADHD, is a condition that makes it hard to pay attention. So you may have problems when you try to focus, get organized, and finish tasks. It might make you more active than other people. Or you might do things without thinking first.  ADHD is very common. It usually starts in early childhood. Many adults don't realize they have it until their children are diagnosed. Then they become aware of their own symptoms. Doctors don't know what causes ADHD. But it often runs in families. ADHD can be treated with medicines, behavior training, and counseling. Treatment can improve your life. Follow-up care is a key part of your treatment and safety. Be sure to make and go to all appointments, and call your doctor if you are having problems. It's also a good idea to know your test results and keep a list of the medicines you take. How can you care for yourself at home? · Learn all you can about ADHD. This will help you and your family understand it better. · Take your medicines exactly as prescribed. Call your doctor if you think you are having a problem with your medicine. You will get more details on the specific medicines your doctor prescribes. · If you miss a dose of your medicine, do not take an extra dose. · If your doctor suggests counseling, find a counselor you like and trust. Talk openly and honestly. Be willing to make some changes. · Find a support group for adults with ADHD. Talking to others with the same problems can help you feel better. It can also give you ideas about how to best cope with the condition. · Get rid of distractions at your work space. Keep your desk clean. Try not to face a window or busy hallway. · Use files, planners, and other tools to keep you organized. · Limit use of alcohol, and do not use illegal drugs.  People with ADHD tend to develop substance use disorder more easily than others. Tell your doctor if you need help to quit. Counseling, support groups, and sometimes medicines can help you stay free of alcohol or drugs. · Get at least 30 minutes of physical activity on most days of the week. Exercise has been shown to help people cope with ADHD. Walking is a good choice. You also may want to do other activities, such as running, swimming, cycling, or playing tennis or team sports. When should you call for help? Watch closely for changes in your health, and be sure to contact your doctor if:    · You feel sad a lot or cry all the time.     · You have trouble sleeping, or you sleep too much.     · You find it hard to concentrate, make decisions, or remember things.     · You change how you normally eat.     · You feel guilty for no reason. Where can you learn more? Go to http://www.morris.com/  Enter B196 in the search box to learn more about \"Attention Deficit Hyperactivity Disorder (ADHD) in Adults: Care Instructions. \"  Current as of: January 31, 2020               Content Version: 12.6  © 0542-7236 Healthwise, Incorporated. Care instructions adapted under license by [employer] (which disclaims liability or warranty for this information). If you have questions about a medical condition or this instruction, always ask your healthcare professional. [address] any warranty or liability for your use of this information. Learning About Attention Deficit Hyperactivity Disorder (ADHD) in Adults  What is ADHD? Attention deficit hyperactivity disorder (ADHD) is a condition in which people have a hard time paying attention. Adults with ADHD also may be more active than normal. They tend to act without thinking. ADHD may make it harder for them to focus, get organized, and finish tasks. ADHD most often starts in childhood and lasts into adulthood.  Many adults don't know that they have ADHD until their children are diagnosed. Then they begin to see their own symptoms. Doctors don't know what causes ADHD. But it tends to run in families. What are the symptoms? The most common types of ADHD symptoms in adults are attention problems and hyperactivity. Attention problems  Adults with ADHD often find it hard to:  · Finish tasks that don't interest them or aren't easy. But they may become obsessed with activities that they find interesting and enjoy. · Keep relationships. · Focus their attention on conversations, reading materials, or jobs. They may change jobs a lot. · Remember things. They may misplace or lose things. · Pay attention. They are easily distracted. They find it hard to focus on one task. · Think before they act. They may make quick decisions. They may act before they think about the effect of their actions. Hyperactivity  Adults with ADHD may:  · Fidget. They may swing their legs, shift in their seats, or tap their fingers. · Move around a lot. They may feel \"revved up\" or on the go. They may not be able to slow down until they are very tired. · Find it hard to relax. They may feel restless and find it hard to do quiet things like read or watch TV. How does ADHD affect daily life? ADHD in adults may affect:  · Job performance. They may find it hard to organize their work, manage their time, and focus on one task at a time. They may forget, misplace, or lose things. They may quit their jobs out of boredom. · Relationships. Adults with ADHD may find it hard to focus their attention on conversations. It is hard for them to \"read\" the behavior and moods of others and express their own feelings. · Temper. They may get easily frustrated. This often can make it harder for them to deal with stress. These adults may overreact and have a short, quick temper. · The ability to solve problems.  Adults who have a hard time waiting for things they want may act before they think about the effect of their actions. They may take part in risky behaviors. These include unprotected sex, unsafe driving, alcohol and drug use, or unwise business ventures. How is ADHD treated? ADHD can be treated with medicines, behavior training, or counseling. Or it may be a combination of these treatments. Medicines  Stimulant medicines are most often used to treat ADHD. These may include:    · Amphetamines (such as Adderall and Dexedrine).     · Methylphenidate (such as Concerta, Daytrana, Focalin, Metadate, and Ritalin). Other medicines that may be used are:    · Atomoxetine, such as Strattera, a nonstimulant medicine for ADHD.     · Antihypertensives. These include clonidine (such as Catapres) and guanfacine (such as Tenex).   · Antidepressants, which include bupropion (Wellbutrin). Behavior training  Behavior training can help adults with ADHD learn how to:    · Get organized. A daily organizer or planner can help these adults organize their daily tasks. They can write down appointments and other things they need to remember.     · Decrease distractions. They can set up their work or home environment so that there are fewer things that will distract them. They may find using headphones or a \"white noise\" machine helpful. College students can arrange a quiet living situation. They may need a single dorm room.     · Work on relationships. Social skills training can help adults with ADHD relate to family, friends, and coworkers. Couples counseling or family therapy can also help improve relationships. Counseling  Counseling is not meant to treat inattention, hyperactivity, or impulsiveness. But it can help with some of the problems that go along with ADHD. These include not getting along well with others and having problems following rules. Where can you learn more?   Go to http://www.gray.com/  Enter X315 in the search box to learn more about \"Learning About Attention Deficit Hyperactivity Disorder (ADHD) in Adults. \"  Current as of: January 31, 2020               Content Version: 12.6  © 2006-2020 [employer]. Care instructions adapted under license by [employer] (which disclaims liability or warranty for this information). If you have questions about a medical condition or this instruction, always ask your healthcare professional. [address] any warranty or liability for your use of this information. Anxiety Disorder: Care Instructions  Your Care Instructions     Anxiety is a normal reaction to stress. Difficult situations can cause you to have symptoms such as sweaty palms and a nervous feeling. In an anxiety disorder, the symptoms are far more severe. Constant worry, muscle tension, trouble sleeping, nausea and diarrhea, and other symptoms can make normal daily activities difficult or impossible. These symptoms may occur for no reason, and they can affect your work, school, or social life. Medicines, counseling, and self-care can all help. Follow-up care is a key part of your treatment and safety. Be sure to make and go to all appointments, and call your doctor if you are having problems. It's also a good idea to know your test results and keep a list of the medicines you take. How can you care for yourself at home? · Take medicines exactly as directed. Call your doctor if you think you are having a problem with your medicine. · Go to your counseling sessions and follow-up appointments. · Recognize and accept your anxiety. Then, when you are in a situation that makes you anxious, say to yourself, \"This is not an emergency. I feel uncomfortable, but I am not in danger. I can keep going even if I feel anxious. \"  · Be kind to your body:  ? Relieve tension with exercise or a massage. ? Get enough rest.  ? Avoid alcohol, caffeine, nicotine, and illegal drugs.  They can increase your anxiety level and cause sleep problems. ? Learn and do relaxation techniques. See below for more about these techniques. · Engage your mind. Get out and do something you enjoy. Go to a funny movie, or take a walk or hike. Plan your day. Having too much or too little to do can make you anxious. · Keep a record of your symptoms. Discuss your fears with a good friend or family member, or join a support group for people with similar problems. Talking to others sometimes relieves stress. · Get involved in social groups, or volunteer to help others. Being alone sometimes makes things seem worse than they are. · Get at least 30 minutes of exercise on most days of the week to relieve stress. Walking is a good choice. You also may want to do other activities, such as running, swimming, cycling, or playing tennis or team sports. Relaxation techniques  Do relaxation exercises 10 to 20 minutes a day. You can play soothing, relaxing music while you do them, if you wish. · Tell others in your house that you are going to do your relaxation exercises. Ask them not to disturb you. · Find a comfortable place, away from all distractions and noise. · Lie down on your back, or sit with your back straight. · Focus on your breathing. Make it slow and steady. · Breathe in through your nose. Breathe out through either your nose or mouth. · Breathe deeply, filling up the area between your navel and your rib cage. Breathe so that your belly goes up and down. · Do not hold your breath. · Breathe like this for 5 to 10 minutes. Notice the feeling of calmness throughout your whole body. As you continue to breathe slowly and deeply, relax by doing the following for another 5 to 10 minutes:  · Tighten and relax each muscle group in your body. You can begin at your toes and work your way up to your head. · Imagine your muscle groups relaxing and becoming heavy. · Empty your mind of all thoughts. · Let yourself relax more and more deeply.   · Become aware of the state of calmness that surrounds you. · When your relaxation time is over, you can bring yourself back to alertness by moving your fingers and toes and then your hands and feet and then stretching and moving your entire body. Sometimes people fall asleep during relaxation, but they usually wake up shortly afterward. · Always give yourself time to return to full alertness before you drive a car or do anything that might cause an accident if you are not fully alert. Never play a relaxation tape while you drive a car. When should you call for help? Call 911 anytime you think you may need emergency care. For example, call if:    · You feel you cannot stop from hurting yourself or someone else. Keep the numbers for these national suicide hotlines: 8-199-052-TALK (5-140.645.6528) and 8-737-BGLISFI (9-912.437.5799). If you or someone you know talks about suicide or feeling hopeless, get help right away. Watch closely for changes in your health, and be sure to contact your doctor if:    · You have anxiety or fear that affects your life.     · You have symptoms of anxiety that are new or different from those you had before. Where can you learn more? Go to http://www.Bizdom.com/  Enter P754 in the search box to learn more about \"Anxiety Disorder: Care Instructions. \"  Current as of: January 31, 2020               Content Version: 12.6  © 8938-3997 [employer]. Care instructions adapted under license by [employer] (which disclaims liability or warranty for this information). If you have questions about a medical condition or this instruction, always ask your healthcare professional. [place] [address] any warranty or liability for your use of this information. Recovering From Depression: Care Instructions  Your Care Instructions     Taking good care of yourself is important as you recover from depression.  In time, your symptoms will fade as your treatment takes hold. Do not give up. Instead, focus your energy on getting better. Your mood will improve. It just takes some time. Focus on things that can help you feel better, such as being with friends and family, eating well, and getting enough rest. But take things slowly. Do not do too much too soon. You will begin to feel better gradually. Follow-up care is a key part of your treatment and safety. Be sure to make and go to all appointments, and call your doctor if you are having problems. It's also a good idea to know your test results and keep a list of the medicines you take. How can you care for yourself at home? Be realistic  · If you have a large task to do, break it up into smaller steps you can handle, and just do what you can. · You may want to put off important decisions until your depression has lifted. If you have plans that will have a major impact on your life, such as marriage, divorce, or a job change, try to wait a bit. Talk it over with friends and loved ones who can help you look at the overall picture first.  · Reaching out to people for help is important. Do not isolate yourself. Let your family and friends help you. Find someone you can trust and confide in, and talk to that person. · Be patient, and be kind to yourself. Remember that depression is not your fault and is not something you can overcome with willpower alone. Treatment is important for depression, just like for any other illness. Feeling better takes time, and your mood will improve little by little. Stay active  · Stay busy and get outside. Take a walk, or try some other light exercise. · Talk with your doctor about an exercise program. Exercise can help with mild depression. · Go to a movie or concert. Take part in a [employer] activity or other social gathering. Go to a ball game. · Ask a friend to have dinner with you.   Take care of yourself  · Eat a balanced diet with plenty of fresh fruits and vegetables, whole grains, and lean protein. If you have lost your appetite, eat small snacks rather than large meals. · Avoid using illegal drugs or marijuana and drinking alcohol. Do not take medicines that have not been prescribed for you. They may interfere with medicines you may be taking for depression, or they may make your depression worse. · Take your medicines exactly as they are prescribed. You may start to feel better within 1 to 3 weeks of taking antidepressant medicine. But it can take as many as 6 to 8 weeks to see more improvement. If you have questions or concerns about your medicines, or if you do not notice any improvement by 3 weeks, talk to your doctor. · Continue to take your medicine after your symptoms improve. Taking your medicine for at least 6 months after you feel better can help keep you from getting depressed again. If this isn't the first time you have been depressed, your doctor may recommend you to take medicine even longer. · If you have any side effects from your medicine, tell your doctor. Many side effects are mild and will go away on their own after you have been taking the medicine for a few weeks. Some may last longer. Talk to your doctor if side effects are bothering you too much. You might be able to try a different medicine. · Continue counseling. It may help prevent depression from returning, especially if you've had multiple episodes of depression. Talk with your counselor if you are having a hard time attending your sessions or you think the sessions aren't working. Don't just stop going. · Get enough sleep. Talk to your doctor if you are having problems sleeping. · Avoid sleeping pills unless they are prescribed by the doctor treating your depression. Sleeping pills may make you groggy during the day, and they may interact with other medicine you are taking.   · If you have any other illnesses, such as diabetes, heart disease, or high blood pressure, make sure to continue with your treatment. Tell your doctor about all of the medicines you take, including those with or without a prescription. · If you or someone you know talks about suicide, self-harm, or feeling hopeless, get help right away. Call the [address] [place] [address] at 1-355-328-LLUM (9-920.518.1190) or text HOME to 783179 to access the Crisis Text Line. Consider saving these numbers in your phone. When should you call for help? Call 911 anytime you think you may need emergency care. For example, call if:    · You feel like hurting yourself or someone else.     · Someone you know has depression and is about to attempt or is attempting suicide. Call your doctor now or seek immediate medical care if:    · You hear voices.     · Someone you know has depression and:  ? Starts to give away his or her possessions. ? Uses illegal drugs or drinks alcohol heavily. ? Talks or writes about death, including writing suicide notes or talking about guns, knives, or pills. ? Starts to spend a lot of time alone. ? Acts very aggressively or suddenly appears calm. Watch closely for changes in your health, and be sure to contact your doctor if:    · You do not get better as expected. Where can you learn more? Go to http://www.gray.com/  Enter N529 in the search box to learn more about \"Recovering From Depression: Care Instructions. \"  Current as of: January 31, 2020               Content Version: 12.6  © 9982-9850 Healthwise, Incorporated. Care instructions adapted under license by [employer] (which disclaims liability or warranty for this information). If you have questions about a medical condition or this instruction, always ask your healthcare professional. [place] [address] any warranty or liability for your use of this information.          Well Visit, Ages 25 to 48: Care Instructions  Your Care Instructions     Physical exams can help you stay healthy. Your doctor has checked your overall health and may have suggested ways to take good care of yourself. He or she also may have recommended tests. At home, you can help prevent illness with healthy eating, regular exercise, and other steps. Follow-up care is a key part of your treatment and safety. Be sure to make and go to all appointments, and call your doctor if you are having problems. It's also a good idea to know your test results and keep a list of the medicines you take. How can you care for yourself at home? · Reach and stay at a healthy weight. This will lower your risk for many problems, such as obesity, diabetes, heart disease, and high blood pressure. · Get at least 30 minutes of physical activity on most days of the week. Walking is a good choice. You also may want to do other activities, such as running, swimming, cycling, or playing tennis or team sports. Discuss any changes in your exercise program with your doctor. · Do not smoke or allow others to smoke around you. If you need help quitting, talk to your doctor about stop-smoking programs and medicines. These can increase your chances of quitting for good. · Talk to your doctor about whether you have any risk factors for sexually transmitted infections (STIs). Having one sex partner (who does not have STIs and does not have sex with anyone else) is a good way to avoid these infections. · Use birth control if you do not want to have children at this time. Talk with your doctor about the choices available and what might be best for you. · Protect your skin from too much sun. When you're outdoors from 10 a.m. to 4 p.m., stay in the shade or cover up with clothing and a hat with a wide brim. Wear sunglasses that block UV rays. Even when it's cloudy, put broad-spectrum sunscreen (SPF 30 or higher) on any exposed skin. · See a dentist one or two times a year for checkups and to have your teeth cleaned.   · Wear a seat belt in the car.  Follow your doctor's advice about when to have certain tests. These tests can spot problems early. For everyone  · Cholesterol. Have the fat (cholesterol) in your blood tested after age 21. Your doctor will tell you how often to have this done based on your age, family history, or other things that can increase your risk for heart disease. · Blood pressure. Have your blood pressure checked during a routine doctor visit. Your doctor will tell you how often to check your blood pressure based on your age, your blood pressure results, and other factors. · Vision. Talk with your doctor about how often to have a glaucoma test.  · Diabetes. Ask your doctor whether you should have tests for diabetes. · Colon cancer. Your risk for colorectal cancer gets higher as you get older. Some experts say that adults should start regular screening at age 48 and stop at age 76. Others say to start before age 48 or continue after age 76. Talk with your doctor about your risk and when to start and stop screening. For women  · Breast exam and mammogram. Talk to your doctor about when you should have a clinical breast exam and a mammogram. Medical experts differ on whether and how often women under 50 should have these tests. Your doctor can help you decide what is right for you. · Cervical cancer screening test and pelvic exam. Begin with a Pap test at age 24. The test often is part of a pelvic exam. Starting at age 27, you may choose to have a Pap test, an HPV test, or both tests at the same time (called co-testing). Talk with your doctor about how often to have testing. · Tests for sexually transmitted infections (STIs). Ask whether you should have tests for STIs. You may be at risk if you have sex with more than one person, especially if your partners do not wear condoms. For men  · Tests for sexually transmitted infections (STIs). Ask whether you should have tests for STIs.  You may be at risk if you have sex with more than one person, especially if you do not wear a condom. · Testicular cancer exam. Ask your doctor whether you should check your testicles regularly. · Prostate exam. Talk to your doctor about whether you should have a blood test (called a PSA test) for prostate cancer. Experts differ on whether and when men should have this test. Some experts suggest it if you are older than 39 and are -American or have a father or brother who got prostate cancer when he was younger than 72. When should you call for help? Watch closely for changes in your health, and be sure to contact your doctor if you have any problems or symptoms that concern you. Where can you learn more? Go to http://www.morris.com/  Enter P072 in the search box to learn more about \"Well Visit, Ages 25 to 48: Care Instructions. \"  Current as of: May 27, 2020               Content Version: 12.6  © 8972-8328 [employer]. Care instructions adapted under license by [employer] (which disclaims liability or warranty for this information). If you have questions about a medical condition or this instruction, always ask your healthcare professional. [address] any warranty or liability for your use of this information.

## 2021-02-23 NOTE — PROGRESS NOTES
Subjective  Kadi Humphrey is a 23 y.o. female presents to establish care   HPI   Anxiety and depression. Was seeing psychiatrist, who prescribed large dose of mediations because he was leaving   Medications effective  No psychiatric admission, SI  ADHD recently diagnosed by psychotherapist. Referred to PCP for medical management. Long history of attention deficit  Had difficulty with focus in HS, completed HS, no ADHD screening then   Insomnia no longer a liliam issue; takes Melotinin  Functioning well on current job, she is not confined to a desk. 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 With 150mg for a total of 450mg    escitalopram oxalate (LEXAPRO) 20 mg tablet Take 1 Tab by mouth daily. Indications: repeated episodes of anxiety, major depressive disorder    polyethylene glycol (MIRALAX) 17 gram/dose powder Take 17 g by mouth daily.  melatonin 1 mg/mL liqd Take 5 mg by mouth nightly as needed.  simethicone (GAS-X) 125 mg capsule Take 125 mg by mouth four (4) times daily as needed for Flatulence.      No current facility-administered medications for this visit. Review of Systems   Constitutional: Negative for fever and weight loss. HENT: Negative. Eyes: Negative. Respiratory: Negative. Cardiovascular: Negative. Gastrointestinal: Negative. Genitourinary: Negative. Musculoskeletal: Negative. Skin: Negative. Neurological: Negative. Psychiatric/Behavioral: Negative for depression. The patient has insomnia. The patient is not nervous/anxious. Objective  Visit Vitals  /81 (BP 1 Location: Left upper arm, BP Patient Position: Sitting, BP Cuff Size: Adult)   Pulse 74   Temp 98.2 °F (36.8 °C) (Oral)   Resp 16   Ht 5' 2\" (1.575 m)   Wt 112 lb 12.8 oz (51.2 kg)   LMP 02/06/2021   SpO2 97%   BMI 20.63 kg/m²     Physical Exam  Constitutional:       General: She is not in acute distress. Appearance: Normal appearance. She is not ill-appearing. HENT:      Right Ear: Tympanic membrane, ear canal and external ear normal. There is no impacted cerumen. Left Ear: Tympanic membrane, ear canal and external ear normal. There is no impacted cerumen. Nose: No congestion. Cardiovascular:      Rate and Rhythm: Normal rate and regular rhythm. Pulmonary:      Effort: Pulmonary effort is normal.      Breath sounds: Normal breath sounds. Neurological:      Mental Status: She is alert. Psychiatric:         Attention and Perception: Attention normal.         Mood and Affect: Mood normal.         Speech: Speech normal.         Behavior: Behavior is cooperative. Cognition and Memory: Cognition normal.         Judgment: Judgment normal.      Comments: Pleasant, conversational           Assessment & Plan    ICD-10-CM ICD-9-CM    1. Physical exam, annual  Z00.00 V70.0 AMB POC URINALYSIS DIP STICK AUTO W/O MICRO   2. Anxiety and depression  F41.9 300.00 buPROPion XL (WELLBUTRIN XL) 300 mg XL tablet    F32.9 311 escitalopram oxalate (LEXAPRO) 20 mg tablet   3.  Attention deficit hyperactivity disorder (ADHD), unspecified ADHD type  F90.9 314.01    4. Needs flu shot  Z23 V04.81 INFLUENZA VIRUS VAC QUAD,SPLIT,PRESV FREE SYRINGE IM     Follow-up and Dispositions    · Return in about 3 months (around 5/23/2021) for adhd. urine dip 2+ blood, menses    Advised will need  report from ADD evaluation before medication can be prescribed ,     current treatment plan is effective, no change in therapy  reviewed diet, exercise and weight control  reviewed medications and side effects in detail      Patient voices understanding and acceptance of this advice and will call back if any further questions or concerns.         Kim Khalil, NP

## 2021-02-23 NOTE — PROGRESS NOTES
Rm 9  Recent Travel Screening and Travel History documentation     Travel Screening     Question   Response    In the last month, have you been in contact with someone who was confirmed or suspected to have Coronavirus / COVID-19? No / Unsure    Have you had a COVID-19 viral test in the last 14 days? No    Do you have any of the following new or worsening symptoms? None of these    Have you traveled internationally in the last month? No      Travel History   Travel since 01/23/21     No documented travel since 01/23/21            Chief Complaint   Patient presents with   [address] 2017    Behavioral Problem     Pt states she went and got tested a month ago and was told to speak with PCP   No covid vaccine  Pt is fasting today  Pt has not seen any other PCP since LOV here. 1. Have you been to the ER, urgent care clinic since your last visit? Hospitalized since your last visit? No    2. Have you seen or consulted any other health care providers outside of the [address] [place] [address] since your last visit? Include any pap smears or colon screening.  No        Health Maintenance Due   Topic Date Due    DTaP/Tdap/Td series (2 - Tdap) 09/16/2009    HPV Age 9Y-34Y (2 - 3-dose series) 03/08/2016    Flu Vaccine (1) 09/01/2020           3 most recent PHQ Screens 2/23/2021   Little interest or pleasure in doing things Not at all   Feeling down, depressed, irritable, or hopeless Several days   Total Score PHQ 2 1   Trouble falling or staying asleep, or sleeping too much -   Feeling tired or having little energy -   Poor appetite, weight loss, or overeating -   Feeling bad about yourself - or that you are a failure or have let yourself or your family down -   Trouble concentrating on things such as school, work, reading, or watching TV -   Moving or speaking so slowly that other people could have noticed; or the opposite being so fidgety that others notice -   Thoughts of being better off dead, or hurting yourself in some way -   PHQ 9 Score -   How difficult have these problems made it for you to do your work, take care of your home and get along with others -           Learning Assessment 8/12/2019   PRIMARY LEARNER Patient   PRIMARY LANGUAGE ENGLISH   LEARNER PREFERENCE PRIMARY VIDEOS   ANSWERED BY patient   RELATIONSHIP SELF         An After Visit Summary was printed and given to the patient.

## 2021-03-30 ENCOUNTER — PATIENT MESSAGE (OUTPATIENT)
Dept: INTERNAL MEDICINE CLINIC | Age: 21
End: 2021-03-30

## 2021-03-30 ENCOUNTER — OFFICE VISIT (OUTPATIENT)
Dept: INTERNAL MEDICINE CLINIC | Age: 21
End: 2021-03-30
Payer: COMMERCIAL

## 2021-03-30 VITALS
HEIGHT: 62 IN | RESPIRATION RATE: 16 BRPM | WEIGHT: 110.8 LBS | DIASTOLIC BLOOD PRESSURE: 74 MMHG | HEART RATE: 76 BPM | BODY MASS INDEX: 20.39 KG/M2 | OXYGEN SATURATION: 100 % | TEMPERATURE: 98.1 F | SYSTOLIC BLOOD PRESSURE: 104 MMHG

## 2021-03-30 DIAGNOSIS — F90.9 ATTENTION DEFICIT HYPERACTIVITY DISORDER (ADHD), UNSPECIFIED ADHD TYPE: Primary | ICD-10-CM

## 2021-03-30 PROCEDURE — 99213 OFFICE O/P EST LOW 20 MIN: CPT | Performed by: NURSE PRACTITIONER

## 2021-03-30 RX ORDER — DEXTROAMPHETAMINE SACCHARATE, AMPHETAMINE ASPARTATE MONOHYDRATE, DEXTROAMPHETAMINE SULFATE AND AMPHETAMINE SULFATE 5; 5; 5; 5 MG/1; MG/1; MG/1; MG/1
20 CAPSULE, EXTENDED RELEASE ORAL
Qty: 3 CAP | Refills: 0 | Status: SHIPPED | OUTPATIENT
Start: 2021-03-30 | End: 2021-03-31 | Stop reason: SDUPTHER

## 2021-03-30 NOTE — PROGRESS NOTES
Rm 8  Recent Travel Screening and Travel History documentation     Travel Screening     Question   Response    In the last month, have you been in contact with someone who was confirmed or suspected to have Coronavirus / COVID-19? No / Unsure    Have you had a COVID-19 viral test in the last 14 days? No    Do you have any of the following new or worsening symptoms? None of these    Have you traveled internationally or domestically in the last month? No      Travel History   Travel since 02/28/21     No documented travel since 02/28/21          Chief Complaint   Patient presents with    Behavioral Problem     ADHD f/u         1. Have you been to the ER, urgent care clinic since your last visit? Hospitalized since your last visit? No    2. Have you seen or consulted any other health care providers outside of the [address] [place] [address] since your last visit? Include any pap smears or colon screening.  No        Health Maintenance Due   Topic Date Due    Hepatitis C Screening  Never done    DTaP/Tdap/Td series (2 - Tdap) 09/16/2009    HPV Age 9Y-34Y (2 - 3-dose series) 03/08/2016    Flu Vaccine (1) Never done           3 most recent PHQ Screens 3/30/2021   Little interest or pleasure in doing things Not at all   Feeling down, depressed, irritable, or hopeless Several days   Total Score PHQ 2 1   Trouble falling or staying asleep, or sleeping too much -   Feeling tired or having little energy -   Poor appetite, weight loss, or overeating -   Feeling bad about yourself - or that you are a failure or have let yourself or your family down -   Trouble concentrating on things such as school, work, reading, or watching TV -   Moving or speaking so slowly that other people could have noticed; or the opposite being so fidgety that others notice -   Thoughts of being better off dead, or hurting yourself in some way -   PHQ 9 Score -   How difficult have these problems made it for you to do your work, take care of your home and get along with others -           Learning Assessment 8/12/2019   PRIMARY LEARNER Patient   PRIMARY LANGUAGE ENGLISH   LEARNER PREFERENCE PRIMARY VIDEOS   ANSWERED BY patient   RELATIONSHIP SELF         An After Visit Summary was printed and given to the patient.

## 2021-03-30 NOTE — LETTER
AGREEMENT for controlled medication treatment Angel Corona, have agreed to a course of treatment that includes taking controlled medication. For this treatment I designate _____________________________________ as the [facility] Provider.  The purpose of this agreement is to prevent misunderstandings about controlled medications I may be taking for treatment, and to comply with applicable laws. I understand this agreement is essential to the trust and confidence necessary in a provider-patient relationship and that the designated provider will treat me in accordance with the statements below. As part of my treatment I agree to the followin.  USE 
a. I will take the controlled medication as instructed by the designated provider and avoid improper use of controlled medications. b.   I will not share, sell or trade controlled medication with anyone as this is a criminal offense.  
c.   I will not use any illegal controlled substance, including marijuana, cocaine, etc. as this is a criminal offense. 2.  PROVIDERS 
a. I will only obtain controlled medication from the designated provider. b.   I will not attempt to obtain the same or similar controlled medications, such as opioid pain medication, stimulants, anti-anxiety or hypnotics from any other provider as this is a criminal offense. 
c.   I will inform the designated provider about all other licensed professionals providing medical care to me and authorize communication between all providers to coordinate care, particularly prescribing or dispensing of controlled medications. 3.  PHARMACY 
a.   I will only fill controlled medication prescriptions at the approved pharmacy as listed below. 4.  OFFICE VISITS 
a. I agree to attend scheduled office visit appointments. b.   I am aware my office visits may be monthly or as determined necessary by the designated provider.  
c.   I will communicate fully with the designated provider about the character and intensity of my symptoms, the effect of the symptoms on my daily life, and how well the controlled medication is helping to relieve the cause of my symptoms. 5.  REFILLS OR CALL-IN PRESCRIPTIONS OF CONTROLLED MEDICATIONS 
a. I agree that refills of my prescriptions for controlled medications will be made at the time of an office visit during regular office hours. No refills will be available during evenings or on weekends. Abusive or inappropriate behavior related to medication refills will not be tolerated. b.   I will not call the office repeatedly to inquire about my controlled medication. I understand that medications will be written on the due date and not before. Calling the office repeatedly will be considered harassment, and I may be discharged from the practice. c.   Controlled medications may not be called in for refill, but doing so is at the discretion of the designated provider. d.   I am aware that only I must  prescriptions for controlled medications at the office but the designated provider may allow a designee to  the prescription from the office under very specific circumstance that may develop. 6.  TOXICOLOGY SCREENING 
a. I agree to random urine toxicology screenings in order to comply with government and [address] [place] [address] regulations. I understand that I will be financially responsible for any charges incurred for the urine toxicology screening, which may not be covered by my insurance. Failure to do so will be considered non-compliance and I may be discharged. b. In some cases an oral swab or hair sample may be substituted for a urine screen. This is at the discretion of the designated provider.   I understand that I will be financially responsible for any charges incurred as well. 
c.   I understand that if the urine toxicology does not show my medications prescribed to me by the designated provider, or it shows any illegal substance or any other medications NOT prescribed by the designated providers, I may be discharged from this practice at the discretion of the designated provider and no further controlled medications will be prescribed or follow-up appointments scheduled. Also, if any illegal substances are detected on the urine toxicology, this information may be provided to local law enforcement. 7. PILL COUNTS 
a. I am aware I may be called at any time to come into the office for a count of all my remaining controlled medications in order to help the designated provider understand the rate at which I use my controlled medications and to more effectively adjust dosage. b. I agree that I will use my medication at a rate NO greater than the prescribed rate and that use of my medication at a greater rate will result in my being without medication for the period of time until next expected due date. c. I will bring in the containers with the medication prescribed by all providers, including the designated provider, to each office visit even if there is no medication remaining. All controlled medication will be in the original containers from the pharmacy for each medication. Failure to do so will be considered non-compliance and I may be discharged from the practice. 8.  LOSS OR THEFT OF MEDICATION 
a. I will safeguard my controlled medication from loss or theft. b.   Lost or stolen controlled medication may not be replaced. This includes a prescription that has not yet been filled at the pharmacy. c.   In the event my controlled medications are stolen or lost, I will notify the designated providers office immediately. If such event occurs during the night, weekend or holiday, I will leave a detailed message on the answering machine or answering service at the number listed above. 
d.   I will file and produce an official police report for any effort to replace controlled medications prescribed. 9.  AGENCY COLLABORATION I authorize the designated provider and the authorized pharmacy/pharmacist to cooperate fully with any city, state, or federal law enforcement agency in the investigation of any possible misuse, sale or other diversion of my controlled medication. 10.  TREATMENT I understand that if I violate any of the conditions, my controlled medication and/or treatment will be terminated. If the violation involves obtaining controlled substances and/or dangerous drugs from another source, the incident may be reported to other medical facilities and authorities, including law enforcement. In this case, the designated provider may taper off the medication over a period of several days, as necessary, to avoid withdrawal symptoms or will suggest alternate treatment facilities. Also, a drug dependence treatment program may be recommended. 11.  AGREEMENT I agree to follow these guidelines that have been fully explained to me. All of my questions and concerns regarding treatment have been adequately answered. A copy of this document has been given to me. I agree to use ______________________________ Authorized Pharmacy located at _________________________________________________________________ Telephone ________________________________for filling ALL controlled medication prescriptions. This agreement is entered into on 3/30/2021 Patient signature__________________________________________________________ Legal Guardian signature___________________________________________________ Provider signature_________________________________________________________ Witness signature_________________________________________________________

## 2021-03-30 NOTE — PATIENT INSTRUCTIONS
Attention Deficit Hyperactivity Disorder (ADHD) in Adults: Care Instructions Your Care Instructions Attention deficit hyperactivity disorder, or ADHD, is a condition that makes it hard to pay attention. So you may have problems when you try to focus, get organized, and finish tasks. It might make you more active than other people. Or you might do things without thinking first. 
ADHD is very common. It usually starts in early childhood. Many adults don't realize they have it until their children are diagnosed. Then they become aware of their own symptoms. Doctors don't know what causes ADHD. But it often runs in families. ADHD can be treated with medicines, behavior training, and counseling. Treatment can improve your life. Follow-up care is a key part of your treatment and safety. Be sure to make and go to all appointments, and call your doctor if you are having problems. It's also a good idea to know your test results and keep a list of the medicines you take. How can you care for yourself at home? · Learn all you can about ADHD. This will help you and your family understand it better. · Take your medicines exactly as prescribed. Call your doctor if you think you are having a problem with your medicine. You will get more details on the specific medicines your doctor prescribes. · If you miss a dose of your medicine, do not take an extra dose. · If your doctor suggests counseling, find a counselor you like and trust. Talk openly and honestly. Be willing to make some changes. · Find a support group for adults with ADHD. Talking to others with the same problems can help you feel better. It can also give you ideas about how to best cope with the condition. · Get rid of distractions at your work space. Keep your desk clean. Try not to face a window or busy hallway. · Use files, planners, and other tools to keep you organized. · Limit use of alcohol, and do not use illegal drugs.  People with ADHD tend to develop substance use disorder more easily than others. Tell your doctor if you need help to quit. Counseling, support groups, and sometimes medicines can help you stay free of alcohol or drugs. · Get at least 30 minutes of physical activity on most days of the week. Exercise has been shown to help people cope with ADHD. Walking is a good choice. You also may want to do other activities, such as running, swimming, cycling, or playing tennis or team sports. When should you call for help? Watch closely for changes in your health, and be sure to contact your doctor if: 
  · You feel sad a lot or cry all the time.  
  · You have trouble sleeping, or you sleep too much.  
  · You find it hard to concentrate, make decisions, or remember things.  
  · You change how you normally eat.  
  · You feel guilty for no reason. Where can you learn more? Go to http://www.morris.com/ Enter B196 in the search box to learn more about \"Attention Deficit Hyperactivity Disorder (ADHD) in Adults: Care Instructions. \" Current as of: January 31, 2020               Content Version: 12.6 © 4319-2693 Healthwise, Incorporated. Care instructions adapted under license by [employer] (which disclaims liability or warranty for this information). If you have questions about a medical condition or this instruction, always ask your healthcare professional. [address] any warranty or liability for your use of this information. Learning About Attention Deficit Hyperactivity Disorder (ADHD) in Adults What is ADHD? Attention deficit hyperactivity disorder (ADHD) is a condition in which people have a hard time paying attention. Adults with ADHD also may be more active than normal. They tend to act without thinking. ADHD may make it harder for them to focus, get organized, and finish tasks. ADHD most often starts in childhood and lasts into adulthood.  Many adults don't know that they have ADHD until their children are diagnosed. Then they begin to see their own symptoms. Doctors don't know what causes ADHD. But it tends to run in families. What are the symptoms? The most common types of ADHD symptoms in adults are attention problems and hyperactivity. Attention problems Adults with ADHD often find it hard to: · Finish tasks that don't interest them or aren't easy. But they may become obsessed with activities that they find interesting and enjoy. · Keep relationships. · Focus their attention on conversations, reading materials, or jobs. They may change jobs a lot. · Remember things. They may misplace or lose things. · Pay attention. They are easily distracted. They find it hard to focus on one task. · Think before they act. They may make quick decisions. They may act before they think about the effect of their actions. Hyperactivity Adults with ADHD may: · Fidget. They may swing their legs, shift in their seats, or tap their fingers. · Move around a lot. They may feel \"revved up\" or on the go. They may not be able to slow down until they are very tired. · Find it hard to relax. They may feel restless and find it hard to do quiet things like read or watch TV. How does ADHD affect daily life? ADHD in adults may affect: · Job performance. They may find it hard to organize their work, manage their time, and focus on one task at a time. They may forget, misplace, or lose things. They may quit their jobs out of boredom. · Relationships. Adults with ADHD may find it hard to focus their attention on conversations. It is hard for them to \"read\" the behavior and moods of others and express their own feelings. · Temper. They may get easily frustrated. This often can make it harder for them to deal with stress. These adults may overreact and have a short, quick temper. · The ability to solve problems.  Adults who have a hard time waiting for things they want may act before they think about the effect of their actions. They may take part in risky behaviors. These include unprotected sex, unsafe driving, alcohol and drug use, or unwise business ventures. How is ADHD treated? ADHD can be treated with medicines, behavior training, or counseling. Or it may be a combination of these treatments. Medicines Stimulant medicines are most often used to treat ADHD. These may include: 
  · Amphetamines (such as Adderall and Dexedrine).  
  · Methylphenidate (such as Concerta, Daytrana, Focalin, Metadate, and Ritalin). Other medicines that may be used are: 
  · Atomoxetine, such as Strattera, a nonstimulant medicine for ADHD.  
  · Antihypertensives. These include clonidine (such as Catapres) and guanfacine (such as Tenex).   · Antidepressants, which include bupropion (Wellbutrin). Behavior training Behavior training can help adults with ADHD learn how to: 
  · Get organized. A daily organizer or planner can help these adults organize their daily tasks. They can write down appointments and other things they need to remember.  
  · Decrease distractions. They can set up their work or home environment so that there are fewer things that will distract them. They may find using headphones or a \"white noise\" machine helpful. College students can arrange a quiet living situation. They may need a single dorm room.  
  · Work on relationships. Social skills training can help adults with ADHD relate to family, friends, and coworkers. Couples counseling or family therapy can also help improve relationships. Counseling Counseling is not meant to treat inattention, hyperactivity, or impulsiveness. But it can help with some of the problems that go along with ADHD. These include not getting along well with others and having problems following rules. Where can you learn more? Go to http://www.gray.com/ Enter G248 in the search box to learn more about \"Learning About Attention Deficit Hyperactivity Disorder (ADHD) in Adults. \" Current as of: January 31, 2020               Content Version: 12.6 © 1827-2788 [employer]. Care instructions adapted under license by [employer] (which disclaims liability or warranty for this information). If you have questions about a medical condition or this instruction, always ask your healthcare professional. [place] [address] any warranty or liability for your use of this information.

## 2021-03-31 DIAGNOSIS — F90.9 ATTENTION DEFICIT HYPERACTIVITY DISORDER (ADHD), UNSPECIFIED ADHD TYPE: ICD-10-CM

## 2021-03-31 RX ORDER — DEXTROAMPHETAMINE SACCHARATE, AMPHETAMINE ASPARTATE MONOHYDRATE, DEXTROAMPHETAMINE SULFATE AND AMPHETAMINE SULFATE 5; 5; 5; 5 MG/1; MG/1; MG/1; MG/1
20 CAPSULE, EXTENDED RELEASE ORAL
Qty: 27 CAP | Refills: 0 | Status: SHIPPED
Start: 2021-03-31 | End: 2021-04-27 | Stop reason: DRUGHIGH

## 2021-03-31 NOTE — PROGRESS NOTES
Marcos Dillon is a 23 y.o. female for ADHD  HPI     Recent neuropsychology evaluation diagnosed ADHD  She wishes to start medication  No prior use of similar medication   Goal is to improve focus at home  Current job not restrictive and she is able to perform well       Past Medical History:   Diagnosis Date    Contact dermatitis and other eczema, due to unspecified cause     Depression     Gastroparesis     GERD (gastroesophageal reflux disease)        Allergies   Allergen Reactions    Peanut Anaphylaxis       Current Outpatient Medications   Medication Sig    amphetamine-dextroamphetamine XR (ADDERALL XR) 20 mg XR capsule Take 1 Cap by mouth every morning. Max Daily Amount: 20 mg.    buPROPion XL (WELLBUTRIN XL) 300 mg XL tablet Take 1 Tab by mouth every morning. With 150mg for a total of 450mg    escitalopram oxalate (LEXAPRO) 20 mg tablet Take 1 Tab by mouth daily. Indications: repeated episodes of anxiety, major depressive disorder    polyethylene glycol (MIRALAX) 17 gram/dose powder Take 17 g by mouth daily.  melatonin 1 mg/mL liqd Take 5 mg by mouth nightly as needed.  simethicone (GAS-X) 125 mg capsule Take 125 mg by mouth four (4) times daily as needed for Flatulence. No current facility-administered medications for this visit. Review of Systems   Constitutional: Negative. Respiratory: Negative. Cardiovascular: Negative. Neurological: Negative. Objective  Physical Exam  Constitutional:       Appearance: Normal appearance. Cardiovascular:      Rate and Rhythm: Normal rate. Rhythm irregular. Neurological:      Mental Status: She is alert. Psychiatric:         Attention and Perception: Attention normal.         Mood and Affect: Mood normal.         Speech: Speech normal.         Behavior: Behavior normal.         Cognition and Memory: Cognition normal.          Assessment & Plan    ICD-10-CM ICD-9-CM    1.  Attention deficit hyperactivity disorder (ADHD), unspecified ADHD type  F90.9 314.01 amphetamine-dextroamphetamine XR (ADDERALL XR) 20 mg XR capsule     Follow-up and Dispositions    · Return in about 4 weeks (around 4/27/2021) for adhd. Agreement for controlled mediation review with patient and questions answered   Advised to call with any problems or concern,   Reviewed indications for medication adjustment  lab results and schedule of future lab studies reviewed with patient  reviewed medications and side effects in detail      Patient voices understanding and acceptance of this advice and will call back if any further questions or concerns.   Ken Lee, NP

## 2021-04-27 ENCOUNTER — OFFICE VISIT (OUTPATIENT)
Dept: INTERNAL MEDICINE CLINIC | Age: 21
End: 2021-04-27
Payer: COMMERCIAL

## 2021-04-27 VITALS
SYSTOLIC BLOOD PRESSURE: 123 MMHG | HEIGHT: 62 IN | DIASTOLIC BLOOD PRESSURE: 83 MMHG | OXYGEN SATURATION: 98 % | HEART RATE: 107 BPM | TEMPERATURE: 98.4 F | RESPIRATION RATE: 16 BRPM | BODY MASS INDEX: 20.65 KG/M2 | WEIGHT: 112.2 LBS

## 2021-04-27 DIAGNOSIS — F90.9 ATTENTION DEFICIT HYPERACTIVITY DISORDER (ADHD), UNSPECIFIED ADHD TYPE: Primary | ICD-10-CM

## 2021-04-27 PROCEDURE — 99213 OFFICE O/P EST LOW 20 MIN: CPT | Performed by: NURSE PRACTITIONER

## 2021-04-27 RX ORDER — DEXTROAMPHETAMINE SACCHARATE, AMPHETAMINE ASPARTATE MONOHYDRATE, DEXTROAMPHETAMINE SULFATE AND AMPHETAMINE SULFATE 7.5; 7.5; 7.5; 7.5 MG/1; MG/1; MG/1; MG/1
30 CAPSULE, EXTENDED RELEASE ORAL
Qty: 30 CAP | Refills: 0 | Status: SHIPPED | OUTPATIENT
Start: 2021-04-27 | End: 2021-07-15 | Stop reason: SDUPTHER

## 2021-04-27 NOTE — PROGRESS NOTES
Tha Bright is a 23 y.o. female presents for ADHD follow up   HPI     Medication started 3/30/21     Most days don't see a big change    Sometimes feels body tingling , feels she needs to do something     No change in sleep pattern        Past Medical History:   Diagnosis Date    Contact dermatitis and other eczema, due to unspecified cause     Depression     Gastroparesis     GERD (gastroesophageal reflux disease)        Allergies   Allergen Reactions    Peanut Anaphylaxis       Past Surgical History:   Procedure Laterality Date    HX ENDOSCOPY       Review of Systems   Constitutional: Negative. Eyes: Negative. Cardiovascular: Negative for palpitations. Gastrointestinal: Negative. Genitourinary: Negative. Neurological: Positive for tingling. Psychiatric/Behavioral: The patient does not have insomnia. Objective  Physical Exam  Constitutional:       Appearance: Normal appearance. Neck:      Musculoskeletal: Normal range of motion and neck supple. Cardiovascular:      Rate and Rhythm: Normal rate and regular rhythm. Pulses: Normal pulses. Heart sounds: Normal heart sounds. Musculoskeletal:         General: No swelling or tenderness. Skin:     General: Skin is warm and dry. Neurological:      Mental Status: She is alert. Assessment & Plan    ICD-10-CM ICD-9-CM    1. Attention deficit hyperactivity disorder (ADHD), unspecified ADHD type  F90.9 314.01 amphetamine-dextroamphetamine XR (ADDERALL XR) 30 mg XR capsule      DRUG ABUSE PROF, URINE (SEVEN DRUGS), MS COFIRM      DRUG ABUSE PROF, URINE (SEVEN DRUGS), MS COFIRM     Follow-up and Dispositions    · Return in about 4 weeks (around 5/25/2021) for adhd. 1. New diagnosis, current dose of medication non therapeutic, will increase to above dose.    Advised to call provider if any problems or concerns before follow up appointment   reviewed medications and side effects in detail    Patient voices understanding and acceptance of this advice and will call back if any further questions or concerns.   Scott Souza, NP

## 2021-04-27 NOTE — PATIENT INSTRUCTIONS
Attention Deficit Hyperactivity Disorder (ADHD) in Adults: Care Instructions Your Care Instructions Attention deficit hyperactivity disorder, or ADHD, is a condition that makes it hard to pay attention. So you may have problems when you try to focus, get organized, and finish tasks. It might make you more active than other people. Or you might do things without thinking first. 
ADHD is very common. It usually starts in early childhood. Many adults don't realize they have it until their children are diagnosed. Then they become aware of their own symptoms. Doctors don't know what causes ADHD. But it often runs in families. ADHD can be treated with medicines, behavior training, and counseling. Treatment can improve your life. Follow-up care is a key part of your treatment and safety. Be sure to make and go to all appointments, and call your doctor if you are having problems. It's also a good idea to know your test results and keep a list of the medicines you take. How can you care for yourself at home? · Learn all you can about ADHD. This will help you and your family understand it better. · Take your medicines exactly as prescribed. Call your doctor if you think you are having a problem with your medicine. You will get more details on the specific medicines your doctor prescribes. · If you miss a dose of your medicine, do not take an extra dose. · If your doctor suggests counseling, find a counselor you like and trust. Talk openly and honestly. Be willing to make some changes. · Find a support group for adults with ADHD. Talking to others with the same problems can help you feel better. It can also give you ideas about how to best cope with the condition. · Get rid of distractions at your work space. Keep your desk clean. Try not to face a window or busy hallway. · Use files, planners, and other tools to keep you organized. · Limit use of alcohol, and do not use illegal drugs.  People with ADHD tend to develop substance use disorder more easily than others. Tell your doctor if you need help to quit. Counseling, support groups, and sometimes medicines can help you stay free of alcohol or drugs. · Get at least 30 minutes of physical activity on most days of the week. Exercise has been shown to help people cope with ADHD. Walking is a good choice. You also may want to do other activities, such as running, swimming, cycling, or playing tennis or team sports. When should you call for help? Watch closely for changes in your health, and be sure to contact your doctor if: 
  · You feel sad a lot or cry all the time.  
  · You have trouble sleeping, or you sleep too much.  
  · You find it hard to concentrate, make decisions, or remember things.  
  · You change how you normally eat.  
  · You feel guilty for no reason. Where can you learn more? Go to http://www.morris.com/ Enter B196 in the search box to learn more about \"Attention Deficit Hyperactivity Disorder (ADHD) in Adults: Care Instructions. \" Current as of: September 23, 2020               Content Version: 12.8 © 4370-7241 [employer] ([employer]). Care instructions adapted under license by [employer] (which disclaims liability or warranty for this information). If you have questions about a medical condition or this instruction, always ask your healthcare professional. [place] [address] any warranty or liability for your use of this information. Learning About Stimulant Medicines for Attention Deficit Hyperactivity Disorder (ADHD) How are stimulant medicines used to treat ADHD? Stimulant medicines affect certain chemicals in the brain. They can help a person with ADHD to focus better. And they can make the person less hyperactive and impulsive. ADHD is treated with medicines and behavior therapy. Stimulants are the medicines used most. 
What are some types of these medicines? Stimulant medicines may include: · Dexmethylphenidate (Focalin). · Lisdexamfetamine (Vyvanse). · Methylphenidate (Concerta, Daytrana, Metadate CD, Methylin, Ritalin). · Mixed salts amphetamine (Adderall). How do you take them safely? · Take your medicines exactly as prescribed. Call your doctor if you think you are having a problem with your medicine. You will get more details on the specific medicines your doctor prescribes. · Do not take \"make-up\" doses. If you miss a dose, and if it's not too late in the day, it's okay to take it. But don't double up doses. · Do not misuse your medicine. Some medicines for ADHD can be misused. Some people may take a larger dose than prescribed. They may take them for their non-medical effects. Or they may share or sell them. Misuse can lead to a stimulant use disorder. · Keep close track of your medicine. Don't sell or give medicine to other people. What are the side effects? Common side effects include loss of appetite, a headache, and an upset stomach. You may also have mood changes or sleep problems. Or you may feel nervous. Some stimulant medicines can cause a dry mouth. If these medicines have bothersome side effects or don't work for you, your doctor might prescribe another type of medicine. How long can you expect to use these medicines? Most doctors prescribe a low dose of stimulant medicines at first. Your doctor may have you slowly increase the dose until your symptoms are managed. Or you might get a different medicine or treatment. This can take several weeks. Some doctors may advise taking a break from the medicine over some weekends or during holidays. But this depends on the type of symptoms you have. You may need to take medicine for ADHD for a long time. But your doctor will check now and then to see if you can take a lower dose and still get the benefits of the medicine.  
If you want to stop or reduce your use of the medicine, talk to your doctor first. Some signs that you may be able to lower or stop your dose include: 
· You have no symptoms for more than a year while you take the medicine. · You are doing better at the same dose. · Your behavior is appropriate even if you miss a dose or two. · You are newly able to concentrate. Follow-up care is a key part of your treatment and safety. Be sure to make and go to all appointments, and call your doctor if you are having problems. It's also a good idea to know your test results and keep a list of the medicines you take. Where can you learn more? Go to http://www.gray.com/ Enter S155 in the search box to learn more about \"Learning About Stimulant Medicines for Attention Deficit Hyperactivity Disorder (ADHD). \" Current as of: September 23, 2020               Content Version: 12.8 © 0126-4868 Healthwise, Incorporated. Care instructions adapted under license by [employer] (which disclaims liability or warranty for this information). If you have questions about a medical condition or this instruction, always ask your healthcare professional. [address] any warranty or liability for your use of this information.

## 2021-04-30 LAB
AMPHETAMINES UR QL SCN: NORMAL NG/ML
BARBITURATES UR QL SCN: NORMAL
BENZODIAZ UR QL: NORMAL
BZE UR QL: NORMAL
CANNABINOIDS UR QL SCN: NORMAL
OPIATES UR QL: NORMAL
PCP UR QL: NORMAL

## 2021-05-25 ENCOUNTER — OFFICE VISIT (OUTPATIENT)
Dept: INTERNAL MEDICINE CLINIC | Age: 21
End: 2021-05-25
Payer: COMMERCIAL

## 2021-05-25 VITALS
HEIGHT: 62 IN | SYSTOLIC BLOOD PRESSURE: 123 MMHG | OXYGEN SATURATION: 98 % | DIASTOLIC BLOOD PRESSURE: 85 MMHG | HEART RATE: 92 BPM | BODY MASS INDEX: 20.2 KG/M2 | TEMPERATURE: 98.4 F | RESPIRATION RATE: 16 BRPM | WEIGHT: 109.8 LBS

## 2021-05-25 DIAGNOSIS — N76.0 ACUTE VAGINITIS: ICD-10-CM

## 2021-05-25 DIAGNOSIS — F90.9 ATTENTION DEFICIT HYPERACTIVITY DISORDER (ADHD), UNSPECIFIED ADHD TYPE: Primary | ICD-10-CM

## 2021-05-25 PROCEDURE — 99213 OFFICE O/P EST LOW 20 MIN: CPT | Performed by: NURSE PRACTITIONER

## 2021-05-25 RX ORDER — PEDI MULTIVIT NO.19/FOLIC ACID 200 MCG
TABLET,CHEWABLE ORAL
COMMUNITY
Start: 2021-04-20

## 2021-05-25 RX ORDER — TESTOSTERONE 50 MG/5G
GEL TRANSDERMAL
COMMUNITY
Start: 2021-04-27 | End: 2022-02-09

## 2021-05-25 NOTE — PROGRESS NOTES
Subjective  Trey Mendez is a 23 y.o. female presents for ADHD follow up  HPI     Medication effective but cause dry throat at work. Feels better if she does not take medication daily  Uses voice a lot  at work   Hx of weight fluctuation not associated with medication   Vagina stings when she bathes since on Androgel. No change in detergent or soap. No improvement with milder soap. She has not discussed this with prescriber   Denies rash, lesion or urinary symptoms      Past Medical History:   Diagnosis Date    Contact dermatitis and other eczema, due to unspecified cause     Depression     Gastroparesis     GERD (gastroesophageal reflux disease)        Current Outpatient Medications   Medication Sig    Pediatric Multivit Comb#19-FA (Flintstones Multi-Vit Gummies) 200 mcg chew     testosterone (ANDROGEL) 50 mg/5 gram (1 %) gel     amphetamine-dextroamphetamine XR (ADDERALL XR) 30 mg XR capsule Take 1 Cap by mouth every morning. Max Daily Amount: 30 mg.    buPROPion XL (WELLBUTRIN XL) 300 mg XL tablet Take 1 Tab by mouth every morning. With 150mg for a total of 450mg    escitalopram oxalate (LEXAPRO) 20 mg tablet Take 1 Tab by mouth daily. Indications: repeated episodes of anxiety, major depressive disorder    polyethylene glycol (MIRALAX) 17 gram/dose powder Take 17 g by mouth daily.  melatonin 1 mg/mL liqd Take 5 mg by mouth nightly as needed.  simethicone (GAS-X) 125 mg capsule Take 125 mg by mouth four (4) times daily as needed for Flatulence. No current facility-administered medications for this visit. Allergies   Allergen Reactions    Peanut Anaphylaxis         Review of Systems   Constitutional: Negative. Eyes: Negative. Cardiovascular: Negative for palpitations. Gastrointestinal: Negative. Genitourinary: Negative for dysuria, frequency and urgency. Neurological: Negative for dizziness and headaches.        Objective  Visit Vitals  /85 (BP 1 Location: Left upper arm, BP Patient Position: Sitting, BP Cuff Size: Adult)   Pulse 92   Temp 98.4 °F (36.9 °C) (Oral)   Resp 16   Ht 5' 2\" (1.575 m)   Wt 109 lb 12.8 oz (49.8 kg)   SpO2 98%   BMI 20.08 kg/m²     Physical Exam  Constitutional:       Appearance: Normal appearance. Cardiovascular:      Rate and Rhythm: Normal rate and regular rhythm. Pulses: Normal pulses. Heart sounds: Normal heart sounds. Pulmonary:      Effort: Pulmonary effort is normal.      Breath sounds: Normal breath sounds. Neurological:      Mental Status: She is alert. Psychiatric:         Mood and Affect: Mood normal.         Thought Content: Thought content normal.          Assessment & Plan    ICD-10-CM ICD-9-CM    1. Attention deficit hyperactivity disorder (ADHD), unspecified ADHD type  F90.9 314.01 10-DRUG SCREEN, URINE W RFLX CONFIRMATION      10-DRUG SCREEN, URINE W RFLX CONFIRMATION   2. Acute vaginitis  N76.0 616.10      Follow-up and Dispositions    · Return in about 3 months (around 8/25/2021) for add. Encouraged to discuss possible ADRs with providers at [facility]   current treatment plan is effective, no change in therapy  lab results and schedule of future lab studies reviewed with patient    Patient voices understanding and acceptance of this advice and will call back if any further questions or concerns.   reviewed medications and side effects in detail  Darin Pardo NP

## 2021-05-25 NOTE — PATIENT INSTRUCTIONS
Attention Deficit Hyperactivity Disorder (ADHD) in Adults: Care Instructions  Your Care Instructions     Attention deficit hyperactivity disorder, or ADHD, is a condition that makes it hard to pay attention. So you may have problems when you try to focus, get organized, and finish tasks. It might make you more active than other people. Or you might do things without thinking first.  ADHD is very common. It usually starts in early childhood. Many adults don't realize they have it until their children are diagnosed. Then they become aware of their own symptoms. Doctors don't know what causes ADHD. But it often runs in families. ADHD can be treated with medicines, behavior training, and counseling. Treatment can improve your life. Follow-up care is a key part of your treatment and safety. Be sure to make and go to all appointments, and call your doctor if you are having problems. It's also a good idea to know your test results and keep a list of the medicines you take. How can you care for yourself at home? · Learn all you can about ADHD. This will help you and your family understand it better. · Take your medicines exactly as prescribed. Call your doctor if you think you are having a problem with your medicine. You will get more details on the specific medicines your doctor prescribes. · If you miss a dose of your medicine, do not take an extra dose. · If your doctor suggests counseling, find a counselor you like and trust. Talk openly and honestly. Be willing to make some changes. · Find a support group for adults with ADHD. Talking to others with the same problems can help you feel better. It can also give you ideas about how to best cope with the condition. · Get rid of distractions at your work space. Keep your desk clean. Try not to face a window or busy hallway. · Use files, planners, and other tools to keep you organized. · Limit use of alcohol, and do not use illegal drugs.  People with ADHD tend to develop substance use disorder more easily than others. Tell your doctor if you need help to quit. Counseling, support groups, and sometimes medicines can help you stay free of alcohol or drugs. · Get at least 30 minutes of physical activity on most days of the week. Exercise has been shown to help people cope with ADHD. Walking is a good choice. You also may want to do other activities, such as running, swimming, cycling, or playing tennis or team sports. When should you call for help? Watch closely for changes in your health, and be sure to contact your doctor if:    · You feel sad a lot or cry all the time.     · You have trouble sleeping, or you sleep too much.     · You find it hard to concentrate, make decisions, or remember things.     · You change how you normally eat.     · You feel guilty for no reason. Where can you learn more? Go to http://www.morris.com/  Enter B196 in the search box to learn more about \"Attention Deficit Hyperactivity Disorder (ADHD) in Adults: Care Instructions. \"  Current as of: September 23, 2020               Content Version: 12.8  © 9212-6752 [employer]. Care instructions adapted under license by [employer] (which disclaims liability or warranty for this information). If you have questions about a medical condition or this instruction, always ask your healthcare professional. [address] any warranty or liability for your use of this information. Attention Deficit Hyperactivity Disorder (ADHD) in Adults: Care Instructions  Your Care Instructions     Attention deficit hyperactivity disorder, or ADHD, is a condition that makes it hard to pay attention. So you may have problems when you try to focus, get organized, and finish tasks. It might make you more active than other people. Or you might do things without thinking first.  ADHD is very common. It usually starts in early childhood.  Many adults don't realize they have it until their children are diagnosed. Then they become aware of their own symptoms. Doctors don't know what causes ADHD. But it often runs in families. ADHD can be treated with medicines, behavior training, and counseling. Treatment can improve your life. Follow-up care is a key part of your treatment and safety. Be sure to make and go to all appointments, and call your doctor if you are having problems. It's also a good idea to know your test results and keep a list of the medicines you take. How can you care for yourself at home? · Learn all you can about ADHD. This will help you and your family understand it better. · Take your medicines exactly as prescribed. Call your doctor if you think you are having a problem with your medicine. You will get more details on the specific medicines your doctor prescribes. · If you miss a dose of your medicine, do not take an extra dose. · If your doctor suggests counseling, find a counselor you like and trust. Talk openly and honestly. Be willing to make some changes. · Find a support group for adults with ADHD. Talking to others with the same problems can help you feel better. It can also give you ideas about how to best cope with the condition. · Get rid of distractions at your work space. Keep your desk clean. Try not to face a window or busy hallway. · Use files, planners, and other tools to keep you organized. · Limit use of alcohol, and do not use illegal drugs. People with ADHD tend to develop substance use disorder more easily than others. Tell your doctor if you need help to quit. Counseling, support groups, and sometimes medicines can help you stay free of alcohol or drugs. · Get at least 30 minutes of physical activity on most days of the week. Exercise has been shown to help people cope with ADHD. Walking is a good choice.  You also may want to do other activities, such as running, swimming, cycling, or playing tennis or team sports. When should you call for help? Watch closely for changes in your health, and be sure to contact your doctor if:    · You feel sad a lot or cry all the time.     · You have trouble sleeping, or you sleep too much.     · You find it hard to concentrate, make decisions, or remember things.     · You change how you normally eat.     · You feel guilty for no reason. Where can you learn more? Go to http://www.morris.com/  Enter B196 in the search box to learn more about \"Attention Deficit Hyperactivity Disorder (ADHD) in Adults: Care Instructions. \"  Current as of: September 23, 2020               Content Version: 12.8  © 1049-5766 [employer]. Care instructions adapted under license by [employer] (which disclaims liability or warranty for this information). If you have questions about a medical condition or this instruction, always ask your healthcare professional. [place] [address] any warranty or liability for your use of this information. Learning About Attention Deficit Hyperactivity Disorder (ADHD) in Adults  What is ADHD? Attention deficit hyperactivity disorder (ADHD) is a condition in which people have a hard time paying attention. Adults with ADHD also may be more active than normal. They tend to act without thinking. ADHD may make it harder for them to focus, get organized, and finish tasks. ADHD most often starts in childhood and lasts into adulthood. Many adults don't know that they have ADHD until their children are diagnosed. Then they begin to see their own symptoms. Doctors don't know what causes ADHD. But it tends to run in families. What are the symptoms? The most common types of ADHD symptoms in adults are attention problems and hyperactivity. Attention problems  Adults with ADHD often find it hard to:  · Finish tasks that don't interest them or aren't easy.  But they may become obsessed with activities that they find interesting and enjoy. · Keep relationships. · Focus their attention on conversations, reading materials, or jobs. They may change jobs a lot. · Remember things. They may misplace or lose things. · Pay attention. They are easily distracted. They find it hard to focus on one task. · Think before they act. They may make quick decisions. They may act before they think about the effect of their actions. Hyperactivity  Adults with ADHD may:  · Fidget. They may swing their legs, shift in their seats, or tap their fingers. · Move around a lot. They may feel \"revved up\" or on the go. They may not be able to slow down until they are very tired. · Find it hard to relax. They may feel restless and find it hard to do quiet things like read or watch TV. How does ADHD affect daily life? ADHD in adults may affect:  · Job performance. They may find it hard to organize their work, manage their time, and focus on one task at a time. They may forget, misplace, or lose things. They may quit their jobs out of boredom. · Relationships. Adults with ADHD may find it hard to focus their attention on conversations. It is hard for them to \"read\" the behavior and moods of others and express their own feelings. · Temper. They may get easily frustrated. This often can make it harder for them to deal with stress. These adults may overreact and have a short, quick temper. · The ability to solve problems. Adults who have a hard time waiting for things they want may act before they think about the effect of their actions. They may take part in risky behaviors. These include unprotected sex, unsafe driving, alcohol and drug use, or unwise business ventures. How is ADHD treated? ADHD can be treated with medicines, behavior training, or counseling. Or it may be a combination of these treatments. Medicines  Stimulant medicines are most often used to treat ADHD. These may include:    · Amphetamines.  (Examples are Adderall and Dexedrine).     · Methylphenidate. (Examples are Concerta, Daytrana, Focalin, Metadate, and Ritalin). Other medicines that may be used are:    · Atomoxetine. This includes Strattera, a nonstimulant medicine for ADHD.     · Antihypertensives. These include clonidine (such as Catapres) and guanfacine (such as Tenex).   · Antidepressants. These include bupropion (Wellbutrin). Behavior training  Behavior training can help adults with ADHD learn how to:    · Get organized. A daily organizer or planner can help these adults organize their daily tasks. They can write down appointments and other things they need to remember.     · Decrease distractions. They can set up their work or home environment so that there are fewer things that will distract them. They may find using headphones or a \"white noise\" machine helpful. College students can arrange a quiet living situation. They may need a single dorm room.     · Work on relationships. Social skills training can help adults with ADHD relate to family, friends, and coworkers. Couples counseling or family therapy can also help improve relationships. Counseling  Counseling is not meant to treat inattention, hyperactivity, or impulsiveness. But it can help with some of the problems that go along with ADHD. These include not getting along well with others and having problems following rules. Where can you learn more? Go to http://www.gray.com/  Enter V674 in the search box to learn more about \"Learning About Attention Deficit Hyperactivity Disorder (ADHD) in Adults. \"  Current as of: September 23, 2020               Content Version: 12.8  © 2006-2021 [employer]. Care instructions adapted under license by [employer] (which disclaims liability or warranty for this information).  If you have questions about a medical condition or this instruction, always ask your healthcare professional. [employer] disclaims any warranty or liability for your use of this information.

## 2021-05-30 LAB
ALPRAZ UR QL: NEGATIVE
AMPHET UR CFM-MCNC: 2700 NG/ML
AMPHET UR QL CFM: POSITIVE
AMPHETAMINES UR QL SCN: NORMAL NG/ML
AMPHETAMINES UR QL: POSITIVE
BARBITURATES UR QL SCN: NEGATIVE NG/ML
BENZODIAZ UR QL: NEGATIVE NG/ML
BZE UR QL SCN: NEGATIVE NG/ML
CANNABINOIDS UR QL SCN: NEGATIVE NG/ML
CLONAZEPAM UR QL: NEGATIVE
CREAT UR-MCNC: 89.9 MG/DL (ref 20–300)
FLURAZEPAM UR QL: NEGATIVE
LORAZEPAM UR QL: NEGATIVE
METHADONE UR QL SCN: NEGATIVE NG/ML
METHAMPHET UR QL CFM: NEGATIVE
MIDAZOLAM UR QL CFM: NEGATIVE
NORDIAZEPAM UR QL: NEGATIVE
OPIATES UR QL SCN: NEGATIVE NG/ML
OXAZEPAM UR QL: NEGATIVE
OXYCODONE+OXYMORPHONE UR QL SCN: NEGATIVE NG/ML
PCP UR QL: NEGATIVE NG/ML
PH UR: 7.1 [PH] (ref 4.5–8.9)
PLEASE NOTE:, 733157: NORMAL
PROPOXYPH UR QL SCN: NEGATIVE NG/ML
SP GR UR: 1.01
SPECIMEN STATUS REPORT, ROLRST: NORMAL
TEMAZEPAM UR QL CFM: NEGATIVE
TRIAZOLAM UR QL: NEGATIVE

## 2021-07-15 DIAGNOSIS — F90.9 ATTENTION DEFICIT HYPERACTIVITY DISORDER (ADHD), UNSPECIFIED ADHD TYPE: ICD-10-CM

## 2021-07-15 RX ORDER — DEXTROAMPHETAMINE SACCHARATE, AMPHETAMINE ASPARTATE MONOHYDRATE, DEXTROAMPHETAMINE SULFATE AND AMPHETAMINE SULFATE 7.5; 7.5; 7.5; 7.5 MG/1; MG/1; MG/1; MG/1
30 CAPSULE, EXTENDED RELEASE ORAL
Qty: 30 CAPSULE | Refills: 0 | Status: SHIPPED
Start: 2021-07-15 | End: 2022-02-09

## 2021-07-15 NOTE — TELEPHONE ENCOUNTER
Pt left voicemail requesting refill. Last visit 05/25/2021 JACKIE Mariano   Next appointment 09/14/2021 NP Chantel Mariano   Previous refill encounter(s)   04/27/2021 Adderall-XR #30     No access to      Requested Prescriptions     Pending Prescriptions Disp Refills    amphetamine-dextroamphetamine XR (ADDERALL XR) 30 mg XR capsule 30 Capsule 0     Sig: Take 1 Capsule by mouth every morning. Max Daily Amount: 30 mg.

## 2021-11-09 ENCOUNTER — OFFICE VISIT (OUTPATIENT)
Dept: INTERNAL MEDICINE CLINIC | Age: 21
End: 2021-11-09
Payer: COMMERCIAL

## 2021-11-09 VITALS
SYSTOLIC BLOOD PRESSURE: 121 MMHG | OXYGEN SATURATION: 98 % | TEMPERATURE: 98.4 F | HEIGHT: 62 IN | RESPIRATION RATE: 16 BRPM | WEIGHT: 118.6 LBS | HEART RATE: 92 BPM | DIASTOLIC BLOOD PRESSURE: 81 MMHG | BODY MASS INDEX: 21.83 KG/M2

## 2021-11-09 DIAGNOSIS — F41.9 ANXIETY AND DEPRESSION: ICD-10-CM

## 2021-11-09 DIAGNOSIS — F64.0 GENDER IDENTITY DISORDER OF ADULTHOOD: ICD-10-CM

## 2021-11-09 DIAGNOSIS — F90.9 ATTENTION DEFICIT HYPERACTIVITY DISORDER (ADHD), UNSPECIFIED ADHD TYPE: Primary | ICD-10-CM

## 2021-11-09 DIAGNOSIS — F32.A ANXIETY AND DEPRESSION: ICD-10-CM

## 2021-11-09 PROCEDURE — 99213 OFFICE O/P EST LOW 20 MIN: CPT | Performed by: NURSE PRACTITIONER

## 2021-11-09 RX ORDER — TESTOSTERONE CYPIONATE 200 MG/ML
INJECTION INTRAMUSCULAR
COMMUNITY
Start: 2021-11-07

## 2021-11-09 NOTE — PATIENT INSTRUCTIONS
Attention Deficit Hyperactivity Disorder (ADHD) in Adults: Care Instructions  Your Care Instructions     Attention deficit hyperactivity disorder, or ADHD, is a condition that makes it hard to pay attention. So you may have problems when you try to focus, get organized, and finish tasks. It might make you more active than other people. Or you might do things without thinking first.  ADHD is very common. It usually starts in early childhood. Many adults don't realize they have it until their children are diagnosed. Then they become aware of their own symptoms. Doctors don't know what causes ADHD. But it often runs in families. ADHD can be treated with medicines, behavior training, and counseling. Treatment can improve your life. Follow-up care is a key part of your treatment and safety. Be sure to make and go to all appointments, and call your doctor if you are having problems. It's also a good idea to know your test results and keep a list of the medicines you take. How can you care for yourself at home? · Learn all you can about ADHD. This will help you and your family understand it better. · Take your medicines exactly as prescribed. Call your doctor if you think you are having a problem with your medicine. You will get more details on the specific medicines your doctor prescribes. · If you miss a dose of your medicine, do not take an extra dose. · If your doctor suggests counseling, find a counselor you like and trust. Talk openly and honestly. Be willing to make some changes. · Find a support group for adults with ADHD. Talking to others with the same problems can help you feel better. It can also give you ideas about how to best cope with the condition. · Get rid of distractions at your work space. Keep your desk clean. Try not to face a window or busy hallway. · Use files, planners, and other tools to keep you organized. · Limit use of alcohol, and do not use illegal drugs.  People with ADHD tend to develop substance use disorder more easily than others. Tell your doctor if you need help to quit. Counseling, support groups, and sometimes medicines can help you stay free of alcohol or drugs. · Get at least 30 minutes of physical activity on most days of the week. Exercise has been shown to help people cope with ADHD. Walking is a good choice. You also may want to do other activities, such as running, swimming, cycling, or playing tennis or team sports. When should you call for help? Watch closely for changes in your health, and be sure to contact your doctor if:    · You feel sad a lot or cry all the time.     · You have trouble sleeping, or you sleep too much.     · You find it hard to concentrate, make decisions, or remember things.     · You change how you normally eat.     · You feel guilty for no reason. Where can you learn more? Go to http://www.morris.com/  Enter B196 in the search box to learn more about \"Attention Deficit Hyperactivity Disorder (ADHD) in Adults: Care Instructions. \"  Current as of: June 16, 2021               Content Version: 13.0  © 9841-2909 [employer]. Care instructions adapted under license by [employer] (which disclaims liability or warranty for this information). If you have questions about a medical condition or this instruction, always ask your healthcare professional. [place] [address] any warranty or liability for your use of this information. Learning About Anxiety Disorders  What are anxiety disorders? Anxiety disorders are a type of medical problem. They cause severe anxiety. When you feel anxious, you feel that something bad is about to happen. This feeling interferes with your life. These disorders include:  · Generalized anxiety disorder. You feel worried and stressed about many everyday events and activities.  This goes on for several months and disrupts your life on most days.  · Panic disorder. You have repeated panic attacks. A panic attack is a sudden, intense fear or anxiety. It may make you feel short of breath. Your heart may pound. · Social anxiety disorder. You feel very anxious about what you will say or do in front of people. For example, you may be scared to talk or eat in public. This problem affects your daily life. · Phobias. You are very scared of a specific object, situation, or activity. For example, you may fear spiders, high places, or small spaces. What are the symptoms? Generalized anxiety disorder  Symptoms may include:  · Feeling worried and stressed about many things almost every day. · Feeling tired or irritable. You may have a hard time concentrating. · Having headaches or muscle aches. · Having a hard time getting to sleep or staying asleep. Panic disorder  You may have repeated panic attacks when there is no reason for feeling afraid. You may change your daily activities because you worry that you will have another attack. Symptoms may include:  · Intense fear, terror, or anxiety. · Trouble breathing or very fast breathing. · Chest pain or tightness. · A heartbeat that races or is not regular. Social anxiety disorder  Symptoms may include:  · Fear about a social situation, such as eating in front of others or speaking in public. You may worry a lot. Or you may be afraid that something bad will happen. · Anxiety that can cause you to blush, sweat, and feel shaky. · A heartbeat that is faster than normal.  · A hard time focusing. Phobias  Symptoms may include:  · More fear than most people of being around an object, being in a situation, or doing an activity. You might also be stressed about the chance of being around the thing you fear. · Worry about losing control, panicking, fainting, or having physical symptoms like a faster heartbeat when you are around the situation or object. How are these disorders treated?   Anxiety disorders can be treated with medicines or counseling. A combination of both may be used. Medicines may include:  · Antidepressants. These may help your symptoms by keeping chemicals in your brain in balance. · Benzodiazepines. These may give you short-term relief of your symptoms. Some people use cognitive-behavioral therapy. A therapist helps you learn to change stressful or bad thoughts into helpful thoughts. Lead a healthy lifestyle  A healthy lifestyle may help you feel better. · Get at least 30 minutes of exercise on most days of the week. Walking is a good choice. · Eat a healthy diet. Include fruits, vegetables, lean proteins, and whole grains in your diet each day. · Try to go to bed at the same time every night. Try for 8 hours of sleep a night. · Find ways to manage stress. Try relaxation exercises. · Avoid alcohol and illegal drugs. Follow-up care is a key part of your treatment and safety. Be sure to make and go to all appointments, and call your doctor if you are having problems. It's also a good idea to know your test results and keep a list of the medicines you take. Where can you learn more? Go to http://www.gray.com/  Enter P008 in the search box to learn more about \"Learning About Anxiety Disorders. \"  Current as of: June 16, 2021               Content Version: 13.0  © 2006-2021 Healthwise[employer]. Care instructions adapted under license by [employer] (which disclaims liability or warranty for this information). If you have questions about a medical condition or this instruction, always ask your healthcare professional. [place] [address] any warranty or liability for your use of this information. Recovering From Depression: Care Instructions  Your Care Instructions     Taking good care of yourself is important as you recover from depression. In time, your symptoms will fade as your treatment takes hold. Do not give up.  Instead, focus your energy on getting better. Your mood will improve. It just takes some time. Focus on things that can help you feel better, such as being with friends and family, eating well, and getting enough rest. But take things slowly. Do not do too much too soon. You will begin to feel better gradually. Follow-up care is a key part of your treatment and safety. Be sure to make and go to all appointments, and call your doctor if you are having problems. It's also a good idea to know your test results and keep a list of the medicines you take. How can you care for yourself at home? Be realistic  · If you have a large task to do, break it up into smaller steps you can handle, and just do what you can. · You may want to put off important decisions until your depression has lifted. If you have plans that will have a major impact on your life, such as marriage, divorce, or a job change, try to wait a bit. Talk it over with friends and loved ones who can help you look at the overall picture first.  · Reaching out to people for help is important. Do not isolate yourself. Let your family and friends help you. Find someone you can trust and confide in, and talk to that person. · Be patient, and be kind to yourself. Remember that depression is not your fault and is not something you can overcome with willpower alone. Treatment is important for depression, just like for any other illness. Feeling better takes time, and your mood will improve little by little. Stay active  · Stay busy and get outside. Take a walk, or try some other light exercise. · Talk with your doctor about an exercise program. Exercise can help with mild depression. · Go to a movie or concert. Take part in a Scientologist activity or other social gathering. Go to a ball game. · Ask a friend to have dinner with you. Take care of yourself  · Eat a balanced diet with plenty of fresh fruits and vegetables, whole grains, and lean protein.  If you have lost your appetite, eat small snacks rather than large meals. · Avoid using illegal drugs or marijuana and drinking alcohol. Do not take medicines that have not been prescribed for you. They may interfere with medicines you may be taking for depression, or they may make your depression worse. · Take your medicines exactly as they are prescribed. You may start to feel better within 1 to 3 weeks of taking antidepressant medicine. But it can take as many as 6 to 8 weeks to see more improvement. If you have questions or concerns about your medicines, or if you do not notice any improvement by 3 weeks, talk to your doctor. · Continue to take your medicine after your symptoms improve. Taking your medicine for at least 6 months after you feel better can help keep you from getting depressed again. If this isn't the first time you have been depressed, your doctor may recommend you to take medicine even longer. · If you have any side effects from your medicine, tell your doctor. Many side effects are mild and will go away on their own after you have been taking the medicine for a few weeks. Some may last longer. Talk to your doctor if side effects are bothering you too much. You might be able to try a different medicine. · Continue counseling. It may help prevent depression from returning, especially if you've had multiple episodes of depression. Talk with your counselor if you are having a hard time attending your sessions or you think the sessions aren't working. Don't just stop going. · Get enough sleep. Talk to your doctor if you are having problems sleeping. · Avoid sleeping pills unless they are prescribed by the doctor treating your depression. Sleeping pills may make you groggy during the day, and they may interact with other medicine you are taking. · If you have any other illnesses, such as diabetes, heart disease, or high blood pressure, make sure to continue with your treatment.  Tell your doctor about all of the medicines you take, including those with or without a prescription. · If you or someone you know talks about suicide, self-harm, or feeling hopeless, get help right away. Call the [address] at 1-800-273-talk (0-256.500.4121) or text HOME to 014706 to access the Crisis Text Line. Consider saving these numbers in your phone. When should you call for help? Call 911 anytime you think you may need emergency care. For example, call if:    · You feel like hurting yourself or someone else.     · Someone you know has depression and is about to attempt or is attempting suicide. Call your doctor now or seek immediate medical care if:    · You hear voices.     · Someone you know has depression and:  ? Starts to give away his or her possessions. ? Uses illegal drugs or drinks alcohol heavily. ? Talks or writes about death, including writing suicide notes or talking about guns, knives, or pills. ? Starts to spend a lot of time alone. ? Acts very aggressively or suddenly appears calm. Watch closely for changes in your health, and be sure to contact your doctor if:    · You do not get better as expected. Where can you learn more? Go to http://www.gray.com/  Enter N529 in the search box to learn more about \"Recovering From Depression: Care Instructions. \"  Current as of: June 16, 2021               Content Version: 13.0  © 6819-4045 Healthwise, Incorporated. Care instructions adapted under license by [employer] (which disclaims liability or warranty for this information). If you have questions about a medical condition or this instruction, always ask your healthcare professional. [address] any warranty or liability for your use of this information.

## 2021-11-09 NOTE — PROGRESS NOTES
Heavenly Toussaint is a 19 y.o. female. HPI   Past Medical History:   Diagnosis Date    Contact dermatitis and other eczema, due to unspecified cause     Depression     Gastroparesis     GERD (gastroesophageal reflux disease)        Current Outpatient Medications   Medication Sig    testosterone cypionate (DEPOTESTOTERONE CYPIONATE) 200 mg/mL injection     amphetamine-dextroamphetamine XR (ADDERALL XR) 30 mg XR capsule Take 1 Capsule by mouth every morning. Max Daily Amount: 30 mg.    Pediatric Multivit Comb#19-FA (Flintstones Multi-Vit Gummies) 200 mcg chew     buPROPion XL (WELLBUTRIN XL) 300 mg XL tablet Take 1 Tab by mouth every morning. With 150mg for a total of 450mg    escitalopram oxalate (LEXAPRO) 20 mg tablet Take 1 Tab by mouth daily. Indications: repeated episodes of anxiety, major depressive disorder    polyethylene glycol (MIRALAX) 17 gram/dose powder Take 17 g by mouth daily.  melatonin 1 mg/mL liqd Take 5 mg by mouth nightly as needed.  simethicone (GAS-X) 125 mg capsule Take 125 mg by mouth four (4) times daily as needed for Flatulence.  testosterone (ANDROGEL) 50 mg/5 gram (1 %) gel  (Patient not taking: Reported on 11/9/2021)     No current facility-administered medications for this visit. \"trying to survive\" Always working; 2 jobs   In process of gender reassignment,identifies as male. Saving for top surgery   Continues to see providers at [facility]. Roommate administers  testosterone injections   Testosterone caused yeast infections  She has gained weight in gluteal area, believes from testosterone therapy, not in areas she expected   Had pap smear earlier this year with gyn provider, Dr May Gutierrez     Not taking Adderall as prescribed too busy. Hyperactivity actually help with busy schedule   Also forgetting to take antidepressant  Testosterone therapy   helps with depression             Review of Systems   Constitutional: Negative.   Negative for malaise/fatigue. HENT: Negative. Eyes: Negative. Cardiovascular: Negative for chest pain and leg swelling. Gastrointestinal: Negative. Genitourinary: Negative for dysuria and urgency. Musculoskeletal: Negative. Neurological: Negative. Psychiatric/Behavioral: Negative for depression. The patient is nervous/anxious. The patient does not have insomnia. Objective  Physical Exam  Constitutional:       Appearance: Normal appearance. HENT:      Head: Normocephalic and atraumatic. Right Ear: Tympanic membrane normal.      Left Ear: Tympanic membrane normal.      Nose: No congestion or rhinorrhea. Neck:      Vascular: No carotid bruit. Cardiovascular:      Rate and Rhythm: Normal rate and regular rhythm. Pulses: Normal pulses. Heart sounds: Normal heart sounds. Pulmonary:      Effort: Pulmonary effort is normal.      Breath sounds: Normal breath sounds. Lymphadenopathy:      Cervical: No cervical adenopathy. Skin:     General: Skin is warm and dry. Findings: No erythema or rash. Neurological:      Mental Status: She is alert and oriented to person, place, and time. Mental status is at baseline. Psychiatric:         Mood and Affect: Mood normal.         Behavior: Behavior normal.         Thought Content: Thought content normal.          Assessment & Plan    ICD-10-CM ICD-9-CM    1. Attention deficit hyperactivity disorder (ADHD), unspecified ADHD type  F90.9 314.01    2. Anxiety and depression  F41.9 300.00     F32. A 311    3. Gender identity disorder of adulthood  F64.0 302.85      Follow-up and Dispositions    · Return in about 3 months (around 2/9/2022) for anxiety, depression, adhd.          Discussed importance of adherence  with medical management to optimize control of psychiatric disorders, consider trial off medications, since testosterone therapy reported to be effective for mood disorder and no decrease in functioning off ADHD medication   No need for medication refill today.    Strongly encouraged to follow up with providers at [facility]  Discussed need for close monitoring for ADRs to hormone therapy   reviewed medications and side effects in detail  Asha Stewart NP

## 2021-11-09 NOTE — PROGRESS NOTES
Rm 9  Recent Travel Screening and Travel History documentation     Travel Screening     Question   Response    In the last month, have you been in contact with someone who was confirmed or suspected to have Coronavirus / COVID-19? No / Unsure    Have you had a COVID-19 viral test in the last 14 days? No    Do you have any of the following new or worsening symptoms? Abdominal pain    Have you traveled internationally or domestically in the last month? Yes      Travel History   Travel since 10/09/21    No documented travel since 10/09/21         Chief Complaint   Patient presents with    Attention Deficit Disorder    Medication Evaluation         1. Have you been to the ER, urgent care clinic since your last visit? Hospitalized since your last visit? No    2. Have you seen or consulted any other health care providers outside of the [address] [place] [address] since your last visit? Include any pap smears or colon screening.  No        Health Maintenance Due   Topic Date Due    Hepatitis C Screening  Never done    DTaP/Tdap/Td series (2 - Tdap) 03/20/2010    HPV Age 9Y-34Y (2 - 3-dose series) 03/08/2016    Flu Vaccine (1) Never done           3 most recent PHQ Screens 11/9/2021   Little interest or pleasure in doing things Not at all   Feeling down, depressed, irritable, or hopeless Not at all   Total Score PHQ 2 0   Trouble falling or staying asleep, or sleeping too much -   Feeling tired or having little energy -   Poor appetite, weight loss, or overeating -   Feeling bad about yourself - or that you are a failure or have let yourself or your family down -   Trouble concentrating on things such as school, work, reading, or watching TV -   Moving or speaking so slowly that other people could have noticed; or the opposite being so fidgety that others notice -   Thoughts of being better off dead, or hurting yourself in some way -   PHQ 9 Score -   How difficult have these problems made it for you to do your work, take care of your home and get along with others -           Learning Assessment 8/12/2019   PRIMARY LEARNER Patient   PRIMARY LANGUAGE ENGLISH   LEARNER PREFERENCE PRIMARY VIDEOS   ANSWERED BY patient   RELATIONSHIP SELF         An After Visit Summary was printed and given to the patient.

## 2022-02-09 ENCOUNTER — OFFICE VISIT (OUTPATIENT)
Dept: INTERNAL MEDICINE CLINIC | Age: 22
End: 2022-02-09
Payer: COMMERCIAL

## 2022-02-09 VITALS
DIASTOLIC BLOOD PRESSURE: 78 MMHG | HEART RATE: 81 BPM | HEIGHT: 62 IN | TEMPERATURE: 98.2 F | WEIGHT: 128.2 LBS | RESPIRATION RATE: 16 BRPM | OXYGEN SATURATION: 96 % | BODY MASS INDEX: 23.59 KG/M2 | SYSTOLIC BLOOD PRESSURE: 113 MMHG

## 2022-02-09 DIAGNOSIS — M79.644 THUMB PAIN, RIGHT: ICD-10-CM

## 2022-02-09 DIAGNOSIS — M25.531 RIGHT WRIST PAIN: ICD-10-CM

## 2022-02-09 DIAGNOSIS — F64.0 GENDER IDENTITY DISORDER OF ADULTHOOD: ICD-10-CM

## 2022-02-09 DIAGNOSIS — F90.9 ATTENTION DEFICIT HYPERACTIVITY DISORDER (ADHD), UNSPECIFIED ADHD TYPE: ICD-10-CM

## 2022-02-09 DIAGNOSIS — F41.9 ANXIETY AND DEPRESSION: Primary | ICD-10-CM

## 2022-02-09 DIAGNOSIS — F32.A ANXIETY AND DEPRESSION: Primary | ICD-10-CM

## 2022-02-09 PROCEDURE — 99213 OFFICE O/P EST LOW 20 MIN: CPT | Performed by: NURSE PRACTITIONER

## 2022-02-09 NOTE — PATIENT INSTRUCTIONS
Anxiety Disorder: Care Instructions  Your Care Instructions     Anxiety is a normal reaction to stress. Difficult situations can cause you to have symptoms such as sweaty palms and a nervous feeling. In an anxiety disorder, the symptoms are far more severe. Constant worry, muscle tension, trouble sleeping, nausea and diarrhea, and other symptoms can make normal daily activities difficult or impossible. These symptoms may occur for no reason, and they can affect your work, school, or social life. Medicines, counseling, and self-care can all help. Follow-up care is a key part of your treatment and safety. Be sure to make and go to all appointments, and call your doctor if you are having problems. It's also a good idea to know your test results and keep a list of the medicines you take. How can you care for yourself at home? · Take medicines exactly as directed. Call your doctor if you think you are having a problem with your medicine. · Go to your counseling sessions and follow-up appointments. · Recognize and accept your anxiety. Then, when you are in a situation that makes you anxious, say to yourself, \"This is not an emergency. I feel uncomfortable, but I am not in danger. I can keep going even if I feel anxious. \"  · Be kind to your body:  ? Relieve tension with exercise or a massage. ? Get enough rest.  ? Avoid alcohol, caffeine, nicotine, and illegal drugs. They can increase your anxiety level and cause sleep problems. ? Learn and do relaxation techniques. See below for more about these techniques. · Engage your mind. Get out and do something you enjoy. Go to a funny movie, or take a walk or hike. Plan your day. Having too much or too little to do can make you anxious. · Keep a record of your symptoms. Discuss your fears with a good friend or family member, or join a support group for people with similar problems. Talking to others sometimes relieves stress.   · Get involved in social groups, or volunteer to help others. Being alone sometimes makes things seem worse than they are. · Get at least 30 minutes of exercise on most days of the week to relieve stress. Walking is a good choice. You also may want to do other activities, such as running, swimming, cycling, or playing tennis or team sports. Relaxation techniques  Do relaxation exercises 10 to 20 minutes a day. You can play soothing, relaxing music while you do them, if you wish. · Tell others in your house that you are going to do your relaxation exercises. Ask them not to disturb you. · Find a comfortable place, away from all distractions and noise. · Lie down on your back, or sit with your back straight. · Focus on your breathing. Make it slow and steady. · Breathe in through your nose. Breathe out through either your nose or mouth. · Breathe deeply, filling up the area between your navel and your rib cage. Breathe so that your belly goes up and down. · Do not hold your breath. · Breathe like this for 5 to 10 minutes. Notice the feeling of calmness throughout your whole body. As you continue to breathe slowly and deeply, relax by doing the following for another 5 to 10 minutes:  · Tighten and relax each muscle group in your body. You can begin at your toes and work your way up to your head. · Imagine your muscle groups relaxing and becoming heavy. · Empty your mind of all thoughts. · Let yourself relax more and more deeply. · Become aware of the state of calmness that surrounds you. · When your relaxation time is over, you can bring yourself back to alertness by moving your fingers and toes and then your hands and feet and then stretching and moving your entire body. Sometimes people fall asleep during relaxation, but they usually wake up shortly afterward. · Always give yourself time to return to full alertness before you drive a car or do anything that might cause an accident if you are not fully alert.  Never play a relaxation tape while you drive a car. When should you call for help? Call 911 anytime you think you may need emergency care. For example, call if:    · You feel you cannot stop from hurting yourself or someone else. Keep the numbers for these national suicide hotlines: 4-915-583-TALK (2-019-093-372.531.6645) and 4-778-XFZWELI (4-106.402.7113). If you or someone you know talks about suicide or feeling hopeless, get help right away. Watch closely for changes in your health, and be sure to contact your doctor if:    · You have anxiety or fear that affects your life.     · You have symptoms of anxiety that are new or different from those you had before. Where can you learn more? Go to http://www.morris.com/  Enter P754 in the search box to learn more about \"Anxiety Disorder: Care Instructions. \"  Current as of: June 16, 2021               Content Version: 13.0  © 2006-2021 [employer]. Care instructions adapted under license by [employer] (which disclaims liability or warranty for this information). If you have questions about a medical condition or this instruction, always ask your healthcare professional. [address] any warranty or liability for your use of this information. Depression and Chronic Disease: Care Instructions  Your Care Instructions     A chronic disease is one that you have for a long time. Some chronic diseases can be controlled, but they usually cannot be cured. Depression is common in people with chronic diseases, but it often goes unnoticed. Many people have concerns about seeking treatment for a mental health problem. You may think it's a sign of weakness, or you don't want people to know about it. It's important to overcome these reasons for not seeking treatment. Treating depression or anxiety is good for your health. Follow-up care is a key part of your treatment and safety.  Be sure to make and go to all appointments, and call your doctor if you are having problems. It's also a good idea to know your test results and keep a list of the medicines you take. How can you care for yourself at home? Watch for symptoms of depression  The symptoms of depression are often subtle at first. You may think they are caused by your disease rather than depression. Or you may think it is normal to be depressed when you have a chronic disease. If you are depressed you may:  · Feel sad or hopeless. · Feel guilty or worthless. · Not enjoy the things you used to enjoy. · Feel hopeless, as though life is not worth living. · Have trouble thinking or remembering. · Have low energy, and you may not eat or sleep well. · Pull away from others. · Think often about death or killing yourself. (Keep the numbers for these national suicide hotlines: 2-655-517-TALK [1-670.479.8372] and 6-554-RGDQDDU [1-212.863.6969]. )  Get treatment  By treating your depression, you can feel more hopeful and have more energy. If you feel better, you may take better care of yourself, so your health may improve. · Talk to your doctor if you have any changes in mood during treatment for your disease. · Ask your doctor for help. Counseling, antidepressant medicine, or a combination of the two can help most people with depression. Often a combination works best. Counseling can also help you cope with having a chronic disease. When should you call for help? Call 911 anytime you think you may need emergency care. For example, call if:    · You feel like hurting yourself or someone else.     · Someone you know has depression and is about to attempt or is attempting suicide. Call your doctor now or seek immediate medical care if:    · You hear voices.     · Someone you know has depression and:  ? Starts to give away his or her possessions. ? Uses illegal drugs or drinks alcohol heavily.   ? Talks or writes about death, including writing suicide notes or talking about guns, knives, or pills. ? Starts to spend a lot of time alone. ? Acts very aggressively or suddenly appears calm. Watch closely for changes in your health, and be sure to contact your doctor if:    · You do not get better as expected. Where can you learn more? Go to http://www.gray.com/  Enter A548 in the search box to learn more about \"Depression and Chronic Disease: Care Instructions. \"  Current as of: June 16, 2021               Content Version: 13.0  © 5211-5815 [employer]. Care instructions adapted under license by [employer] (which disclaims liability or warranty for this information). If you have questions about a medical condition or this instruction, always ask your healthcare professional. [address] any warranty or liability for your use of this information. Recovering From Depression: Care Instructions  Your Care Instructions     Taking good care of yourself is important as you recover from depression. In time, your symptoms will fade as your treatment takes hold. Do not give up. Instead, focus your energy on getting better. Your mood will improve. It just takes some time. Focus on things that can help you feel better, such as being with friends and family, eating well, and getting enough rest. But take things slowly. Do not do too much too soon. You will begin to feel better gradually. Follow-up care is a key part of your treatment and safety. Be sure to make and go to all appointments, and call your doctor if you are having problems. It's also a good idea to know your test results and keep a list of the medicines you take. How can you care for yourself at home? Be realistic  · If you have a large task to do, break it up into smaller steps you can handle, and just do what you can. · You may want to put off important decisions until your depression has lifted.  If you have plans that will have a major impact on your life, such as marriage, divorce, or a job change, try to wait a bit. Talk it over with friends and loved ones who can help you look at the overall picture first.  · Reaching out to people for help is important. Do not isolate yourself. Let your family and friends help you. Find someone you can trust and confide in, and talk to that person. · Be patient, and be kind to yourself. Remember that depression is not your fault and is not something you can overcome with willpower alone. Treatment is important for depression, just like for any other illness. Feeling better takes time, and your mood will improve little by little. Stay active  · Stay busy and get outside. Take a walk, or try some other light exercise. · Talk with your doctor about an exercise program. Exercise can help with mild depression. · Go to a movie or concert. Take part in a Judaism activity or other social gathering. Go to a ball game. · Ask a friend to have dinner with you. Take care of yourself  · Eat a balanced diet with plenty of fresh fruits and vegetables, whole grains, and lean protein. If you have lost your appetite, eat small snacks rather than large meals. · Avoid using illegal drugs or marijuana and drinking alcohol. Do not take medicines that have not been prescribed for you. They may interfere with medicines you may be taking for depression, or they may make your depression worse. · Take your medicines exactly as they are prescribed. You may start to feel better within 1 to 3 weeks of taking antidepressant medicine. But it can take as many as 6 to 8 weeks to see more improvement. If you have questions or concerns about your medicines, or if you do not notice any improvement by 3 weeks, talk to your doctor. · Continue to take your medicine after your symptoms improve. Taking your medicine for at least 6 months after you feel better can help keep you from getting depressed again.  If this isn't the first time you have been depressed, your doctor may recommend you to take medicine even longer. · If you have any side effects from your medicine, tell your doctor. Many side effects are mild and will go away on their own after you have been taking the medicine for a few weeks. Some may last longer. Talk to your doctor if side effects are bothering you too much. You might be able to try a different medicine. · Continue counseling. It may help prevent depression from returning, especially if you've had multiple episodes of depression. Talk with your counselor if you are having a hard time attending your sessions or you think the sessions aren't working. Don't just stop going. · Get enough sleep. Talk to your doctor if you are having problems sleeping. · Avoid sleeping pills unless they are prescribed by the doctor treating your depression. Sleeping pills may make you groggy during the day, and they may interact with other medicine you are taking. · If you have any other illnesses, such as diabetes, heart disease, or high blood pressure, make sure to continue with your treatment. Tell your doctor about all of the medicines you take, including those with or without a prescription. · If you or someone you know talks about suicide, self-harm, or feeling hopeless, get help right away. Call the [address] [place] [address] at 9-868-649-MHOL ([phone]) or text HOME to 288295 to access the Crisis Text Line. Consider saving these numbers in your phone. When should you call for help? Call 957 anytime you think you may need emergency care. For example, call if:    · You feel like hurting yourself or someone else.     · Someone you know has depression and is about to attempt or is attempting suicide. Call your doctor now or seek immediate medical care if:    · You hear voices.     · Someone you know has depression and:  ? Starts to give away his or her possessions. ? Uses illegal drugs or drinks alcohol heavily.   ? Talks or writes about death, including writing suicide notes or talking about guns, knives, or pills. ? Starts to spend a lot of time alone. ? Acts very aggressively or suddenly appears calm. Watch closely for changes in your health, and be sure to contact your doctor if:    · You do not get better as expected. Where can you learn more? Go to http://www.gray.com/  Enter N529 in the search box to learn more about \"Recovering From Depression: Care Instructions. \"  Current as of: June 16, 2021               Content Version: 13.0  © 3535-3404 [employer]. Care instructions adapted under license by [employer] (which disclaims liability or warranty for this information). If you have questions about a medical condition or this instruction, always ask your healthcare professional. [address] any warranty or liability for your use of this information.

## 2022-02-09 NOTE — PROGRESS NOTES
Rm 7    Chief Complaint   Patient presents with    Anxiety    Depression    Behavioral Problem     ADHD    Wrist Pain     right side only, off and on, started about 2-3 months ago    Medication Evaluation     pt does not want to take adderall any longer due to lack of appetite         1. Have you been to the ER, urgent care clinic since your last visit? Hospitalized since your last visit? No    2. Have you seen or consulted any other health care providers outside of the [address] [place] [address] since your last visit? Include any pap smears or colon screening. No        Health Maintenance Due   Topic Date Due    Hepatitis C Screening  Never done    DTaP/Tdap/Td series (2 - Tdap) 03/20/2010    HPV Age 9Y-34Y (2 - 3-dose series) 03/08/2016    Flu Vaccine (1) Never done    COVID-19 Vaccine (3 - Booster for Pfizer series) 10/19/2021           3 most recent PHQ Screens 2/9/2022   Little interest or pleasure in doing things Not at all   Feeling down, depressed, irritable, or hopeless Not at all   Total Score PHQ 2 0   Trouble falling or staying asleep, or sleeping too much -   Feeling tired or having little energy -   Poor appetite, weight loss, or overeating -   Feeling bad about yourself - or that you are a failure or have let yourself or your family down -   Trouble concentrating on things such as school, work, reading, or watching TV -   Moving or speaking so slowly that other people could have noticed; or the opposite being so fidgety that others notice -   Thoughts of being better off dead, or hurting yourself in some way -   PHQ 9 Score -   How difficult have these problems made it for you to do your work, take care of your home and get along with others -           Learning Assessment 8/12/2019   PRIMARY LEARNER Patient   PRIMARY LANGUAGE ENGLISH   LEARNER PREFERENCE PRIMARY VIDEOS   ANSWERED BY patient   RELATIONSHIP SELF         An After Visit Summary was printed and given to the patient.

## 2022-03-15 ENCOUNTER — OFFICE VISIT (OUTPATIENT)
Dept: ORTHOPEDIC SURGERY | Age: 22
End: 2022-03-15
Payer: COMMERCIAL

## 2022-03-15 VITALS — WEIGHT: 120 LBS | HEIGHT: 62 IN | BODY MASS INDEX: 22.08 KG/M2

## 2022-03-15 DIAGNOSIS — G56.01 CARPAL TUNNEL SYNDROME OF RIGHT WRIST: ICD-10-CM

## 2022-03-15 DIAGNOSIS — M65.4 TENOSYNOVITIS, DE QUERVAIN: ICD-10-CM

## 2022-03-15 DIAGNOSIS — M25.531 RIGHT WRIST PAIN: Primary | ICD-10-CM

## 2022-03-15 DIAGNOSIS — Q74.0 CONGENITAL POSITIVE ULNAR VARIANCE OF RIGHT WRIST: ICD-10-CM

## 2022-03-15 DIAGNOSIS — M79.641 RIGHT HAND PAIN: ICD-10-CM

## 2022-03-15 PROCEDURE — 99203 OFFICE O/P NEW LOW 30 MIN: CPT | Performed by: PHYSICIAN ASSISTANT

## 2022-03-15 NOTE — PROGRESS NOTES
HPI: Suresh Jiang (: 2000) is a 19 y.o. patient, here for evaluation of the following chief complaint(s): Patient presents with right hand and wrist pain that has been bothering him for approximately 2 months. He points to the thumb [facility] as a particular point of discomfort. The symptoms do not awaken him. He denies injury/trauma. He denies symptoms to the left. He is a , but states that he has not had any extreme tugging or pulling of the wrist or hand. No other complaints or concerns. X-rays of the right wrist obtained today. Wrist Pain (Right ) and Hand Pain (Right)       Vitals:  Ht 5' 2\" (1.575 m)   Wt 120 lb (54.4 kg)   BMI 21.95 kg/m²    Body mass index is 21.95 kg/m². Allergies   Allergen Reactions    Peanut Anaphylaxis       Current Outpatient Medications   Medication Sig    testosterone cypionate (DEPOTESTOTERONE CYPIONATE) 200 mg/mL injection     Pediatric Multivit Comb#19-FA (Flintstones Multi-Vit Gummies) 200 mcg chew     buPROPion XL (WELLBUTRIN XL) 300 mg XL tablet Take 1 Tab by mouth every morning. With 150mg for a total of 450mg    escitalopram oxalate (LEXAPRO) 20 mg tablet Take 1 Tab by mouth daily. Indications: repeated episodes of anxiety, major depressive disorder    polyethylene glycol (MIRALAX) 17 gram/dose powder Take 17 g by mouth daily.  melatonin 1 mg/mL liqd Take 5 mg by mouth nightly as needed.  simethicone (GAS-X) 125 mg capsule Take 125 mg by mouth four (4) times daily as needed for Flatulence. No current facility-administered medications for this visit.        Past Medical History:   Diagnosis Date    Contact dermatitis and other eczema, due to unspecified cause     Depression     Gastroparesis     GERD (gastroesophageal reflux disease)         Past Surgical History:   Procedure Laterality Date    HX ENDOSCOPY         Family History   Problem Relation Age of Onset    Hypertension Mother     Depression Mother     Stroke Maternal Grandmother     Depression Maternal Grandmother     Ovarian Cancer Maternal Grandmother         Social History     Tobacco Use    Smoking status: Never Smoker    Smokeless tobacco: Never Used   Substance Use Topics    Alcohol use: No    Drug use: No        Review of Systems    Constitutional: No fevers, chills, night sweats, excessive fatigue or weight loss. Musculoskeletal: No joint pain, swelling or redness. No decreased range of motion. Neurologic: No headache, blurred vision, and no areas of focal weakness or numbness. Normal gait. No sensory problems. Respiratory: No dyspnea on exertion, orthopnea, chest pain, cough or hemoptysis. Cardiovascular: No anginal chest pain, irregular heart beat, tachycardia, palpitations or orthopnea  Integumentary: No chronic rashes, inflammation, ulcerations, pruritus, petechiae, purpura, ecchymoses, or skin changes       Physical Exam    General: Alert, cooperative, no distress  Musculosketal: Right hand - Normal range of motion. Normal sensation. No cysts. No erythema, edema or warmth to the joints. No tenderness to palpation at the thumb [facility] [employer] joint. No discomfort reproduced with grind testing. No crepitus. Right wrist - Normal range of motion. Normal sensation. Positive Tinel's and Phalen's test.  Positive Finklestein's test.  No cysts. No erythema, edema or warmth to the joints. Left wrist - Normal range of motion. Normal sensation. Negative Tinel's, Phalen's and Finkelstein's test.  No cysts. No erythema, edema or warmth to the joints. Neurologic:  CNII-XII intact, Normal strength, sensation, and reflexes throughout    XR Results (most recent):  Results from Appointment encounter on 03/15/22    XR WRIST RT AP/LAT/OBL MIN 3V    Narrative  Normal osseous and joint space findings of the carpals. No fractures. Ulnar positive variance appreciated.      ASSESSMENT/PLAN:  Below is the assessment and plan developed based on review of pertinent history, physical exam, labs, studies, and medications. Right hand and wrist pain that has been bothering him for approximately 2 months. He points to the thumb [facility] joint as a particular point of discomfort. The symptoms do not awaken him. He denies injury/trauma. He denies symptoms to the left. He is a , but states that he has not had any extreme tugging or pulling of the wrist or hand. Clinical exam is consistent with possible right wrist carpal tunnel syndrome along with de Quervain's tenosynovitis. No sign of thumb [facility] joint arthritis. He already has braces which will assist with the de Quervain's and the carpal tunnel symptoms. In the meantime, we will order an EMG study to further evaluate her symptoms. He deferred a prescription for a steroid. He will follow-up after the study. 1. Right wrist pain  -     XR WRIST RT AP/LAT/OBL MIN 3V; Future  2. Right hand pain  -     EMG ONE EXTREMITY UPPER RT; Future  3. Carpal tunnel syndrome of right wrist  -     EMG ONE EXTREMITY UPPER RT; Future  4. 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 Tenosynovitis, de Quervain      Return in about 4 weeks (around 4/12/2022). Dr. Carlos Yañez was available for immediate consult during this encounter. An electronic signature was used to authenticate this note.   -- Jamie Lanes, PA-C

## 2022-03-18 PROBLEM — N94.6 DYSMENORRHEA IN ADOLESCENT: Status: ACTIVE | Noted: 2017-07-14

## 2022-03-19 PROBLEM — K59.00 CONSTIPATION: Status: ACTIVE | Noted: 2017-07-14

## 2022-03-19 PROBLEM — K31.84 GASTROPARESIS: Status: ACTIVE | Noted: 2017-07-14

## 2022-03-19 PROBLEM — L70.9 ACNE: Status: ACTIVE | Noted: 2017-07-14

## 2022-03-19 PROBLEM — F41.8 ANXIETY ASSOCIATED WITH DEPRESSION: Status: ACTIVE | Noted: 2017-07-14

## 2022-03-20 PROBLEM — F33.42 RECURRENT MAJOR DEPRESSIVE DISORDER, IN FULL REMISSION (HCC): Status: ACTIVE | Noted: 2020-07-14

## 2022-03-20 PROBLEM — R41.840 ATTENTION DEFICIT: Status: ACTIVE | Noted: 2020-07-14

## 2022-03-24 PROBLEM — Q74.0 CONGENITAL POSITIVE ULNAR VARIANCE OF RIGHT WRIST: Status: ACTIVE | Noted: 2022-03-15

## 2022-03-24 PROBLEM — G56.01 CARPAL TUNNEL SYNDROME OF RIGHT WRIST: Status: ACTIVE | Noted: 2022-03-15

## 2022-03-24 PROBLEM — M79.641 RIGHT HAND PAIN: Status: ACTIVE | Noted: 2022-03-15

## 2022-03-24 PROBLEM — M65.4 TENOSYNOVITIS, DE QUERVAIN: Status: ACTIVE | Noted: 2022-03-15

## 2022-04-22 ENCOUNTER — OFFICE VISIT (OUTPATIENT)
Dept: INTERNAL MEDICINE CLINIC | Age: 22
End: 2022-04-22
Payer: MEDICAID

## 2022-04-22 VITALS
HEART RATE: 64 BPM | HEIGHT: 62 IN | RESPIRATION RATE: 16 BRPM | SYSTOLIC BLOOD PRESSURE: 123 MMHG | TEMPERATURE: 97.7 F | BODY MASS INDEX: 23.04 KG/M2 | WEIGHT: 125.2 LBS | OXYGEN SATURATION: 100 % | DIASTOLIC BLOOD PRESSURE: 70 MMHG

## 2022-04-22 DIAGNOSIS — F90.9 ATTENTION DEFICIT HYPERACTIVITY DISORDER (ADHD), UNSPECIFIED ADHD TYPE: Primary | ICD-10-CM

## 2022-04-22 PROCEDURE — 99214 OFFICE O/P EST MOD 30 MIN: CPT | Performed by: NURSE PRACTITIONER

## 2022-04-22 NOTE — PROGRESS NOTES
Rm 9    Chief Complaint   Patient presents with    Medication Evaluation   Breast removal, 2/24/22      1. Have you been to the ER, urgent care clinic since your last visit? Hospitalized since your last visit? No    2. Have you seen or consulted any other health care providers outside of the [address] [place] [address] since your last visit? Include any pap smears or colon screening. No        Health Maintenance Due   Topic Date Due    Hepatitis C Screening  Never done    DTaP/Tdap/Td series (2 - Tdap) 03/20/2010    HPV Age 9Y-34Y (2 - 3-dose series) 03/08/2016    COVID-19 Vaccine (3 - Booster for Pfizer series) 10/19/2021           3 most recent PHQ Screens 4/22/2022   Little interest or pleasure in doing things Not at all   Feeling down, depressed, irritable, or hopeless Not at all   Total Score PHQ 2 0   Trouble falling or staying asleep, or sleeping too much -   Feeling tired or having little energy -   Poor appetite, weight loss, or overeating -   Feeling bad about yourself - or that you are a failure or have let yourself or your family down -   Trouble concentrating on things such as school, work, reading, or watching TV -   Moving or speaking so slowly that other people could have noticed; or the opposite being so fidgety that others notice -   Thoughts of being better off dead, or hurting yourself in some way -   PHQ 9 Score -   How difficult have these problems made it for you to do your work, take care of your home and get along with others -           Learning Assessment 8/12/2019   PRIMARY LEARNER Patient   PRIMARY LANGUAGE ENGLISH   LEARNER PREFERENCE PRIMARY VIDEOS   ANSWERED BY patient   RELATIONSHIP SELF         An After Visit Summary was printed and given to the patient.

## 2022-04-22 NOTE — PATIENT INSTRUCTIONS
Learning About Attention Deficit Hyperactivity Disorder (ADHD) in Adults  What is ADHD? Attention deficit hyperactivity disorder (ADHD) is a condition in which people have a hard time paying attention. Adults with ADHD also may be more active than normal. They tend to act without thinking. ADHD may make it harder for them to focus, get organized, and finish tasks. ADHD most often starts in childhood and lasts into adulthood. Many adults don't know that they have ADHD until their children are diagnosed. Then they begin to see their own symptoms. Doctors don't know what causes ADHD. But it tends to run in families. What are the symptoms? The most common types of ADHD symptoms in adults are attention problems and hyperactivity. Attention problems  Adults with ADHD often find it hard to:  · Finish tasks that don't interest them or aren't easy. But they may become obsessed with activities that they find interesting and enjoy. · Keep relationships. · Focus their attention on conversations, reading materials, or jobs. They may change jobs a lot. · Remember things. They may misplace or lose things. · Pay attention. They are easily distracted. They find it hard to focus on one task. · Think before they act. They may make quick decisions. They may act before they think about the effect of their actions. Hyperactivity  Adults with ADHD may:  · Fidget. They may swing their legs, shift in their seats, or tap their fingers. · Move around a lot. They may feel \"revved up\" or on the go. They may not be able to slow down until they are very tired. · Find it hard to relax. They may feel restless and find it hard to do quiet things like read or watch TV. How does ADHD affect daily life? ADHD in adults may affect:  · Job performance. They may find it hard to organize their work, manage their time, and focus on one task at a time. They may forget, misplace, or lose things.  They may quit their jobs out of boredom. · Relationships. Adults with ADHD may find it hard to focus their attention on conversations. It is hard for them to \"read\" the behavior and moods of others and express their own feelings. · Temper. They may get easily frustrated. This often can make it harder for them to deal with stress. These adults may overreact and have a short, quick temper. · The ability to solve problems. Adults who have a hard time waiting for things they want may act before they think about the effect of their actions. They may take part in risky behaviors. These include unprotected sex, unsafe driving, alcohol and drug use, or unwise business ventures. How is ADHD treated? ADHD can be treated with medicines, behavior training, or counseling. Or it may be a combination of these treatments. Medicines  Stimulant medicines are most often used to treat ADHD. These may include:    · Amphetamines. (Examples are Adderall and Dexedrine).     · Methylphenidate. (Examples are Concerta, Daytrana, Focalin, Metadate, and Ritalin). Other medicines that may be used are:    · Atomoxetine. This includes Strattera, a nonstimulant medicine for ADHD.     · Antihypertensives. These include clonidine (such as Catapres) and guanfacine (such as Tenex).   · Antidepressants. These include bupropion (Wellbutrin). Behavior training  Behavior training can help adults with ADHD learn how to:    · Get organized. A daily organizer or planner can help these adults organize their daily tasks. They can write down appointments and other things they need to remember.     · Decrease distractions. They can set up their work or home environment so that there are fewer things that will distract them. They may find using headphones or a \"white noise\" machine helpful. College students can arrange a quiet living situation. They may need a single dorm room.     · Work on relationships.  Social skills training can help adults with ADHD relate to family, friends, and coworkers. Couples counseling or family therapy can also help improve relationships. Counseling  Counseling is not meant to treat inattention, hyperactivity, or impulsiveness. But it can help with some of the problems that go along with ADHD. These include not getting along well with others and having problems following rules. Where can you learn more? Go to http://www.gray.com/  Enter O514 in the search box to learn more about \"Learning About Attention Deficit Hyperactivity Disorder (ADHD) in Adults. \"  Current as of: June 16, 2021               Content Version: 13.2  © 0641-2468 [email or web address]. Care instructions adapted under license by [employer] (which disclaims liability or warranty for this information). If you have questions about a medical condition or this instruction, always ask your healthcare professional. [place] [address] any warranty or liability for your use of this information. Learning About How to Care for Your Child Who Is Starting Medicines for ADHD  How can you care for your child? Medicines for ADHD may help your child be more calm and focused. Stimulant medicines are often used to treat ADHD. If they don't work, your child's doctor might prescribe a nonstimulant medicine. Nonstimulants may be used alone or along with stimulants. Here are some ways to care for your child if your child is starting medicines for ADHD. · Tell the doctor if your child has other health conditions. Let the doctor know if your child has any heart problems or heart defects or if there is a family history of these problems. This may affect what type of medicine the doctor prescribes for your child. · Watch for side effects. Many side effects will go away after your child takes the medicine for a few weeks. If they don't go away, the doctor may need to adjust the dose or timing of the medicine.  Or the doctor may need to change the medicine. ? Common side effects include loss of appetite, trouble sleeping, and feeling nervous. Other side effects are headaches, dizziness, an upset stomach, and the heart beating fast or irregularly (palpitations). Also watch for mood changes and repeated jerks or muscle movements (tics). ? Stimulant medicines may be linked to slower growth in children, especially in the first year of taking the medicine. But most children seem to catch up in height and weight by the time they're adults. ? Some nonstimulant medicines may increase the risk that a child will think about or try suicide, especially in the first few weeks of use. Some warning signs of suicide include talking about feeling hopeless or wanting to die. Withdrawing from friends and family is also a warning sign. Get help right away if you see any of these signs. · Help your child manage mild side effects. For example, if your child has trouble sleeping, try keeping the bedroom quiet, dark, and cool. Or if your child has an upset stomach, they may need to eat smaller meals throughout the day. Ask your child's doctor for more ways to manage mild side effects. · Give medicines as prescribed. If your child misses a dose, don't give a double dose. Don't stop giving your child the medicine. If you want to stop or reduce your child's use of the medicine, talk to the doctor first.  · Keep track of the medicines. ? Tell your child to not share their medicines with others. ? Make sure that your child doesn't misuse medicines, such as taking a larger dose than prescribed. ? Make sure that medicines are stored safely at home and at school. Lock up medicines. And store them at room temperature. ? If your child takes a midday dose, let your child's teacher know. The school nurse or other staff member will need to give your child the medicine. · Look for signs that the medicine is working. Some medicines start working quickly.  Others may take several weeks. Ask the doctor when you might notice any changes in your child. Your child may:  ? Have more focus. ? Be calmer or less restless. ? Have better relationships. ? Do better at school. · Check in with your child's teacher. Tell the teacher about your child's medicines. Ask for progress reports on how your child is doing in class. · Let the doctor know if your child's symptoms aren't getting better. And let the doctor know if the medicine stops working too early in the day. The doctor may need to adjust the dose or timing of the medicine. Or your child may need to try several different medicines. It can take a while to find the medicine and dosage that works best. Your child also may need to be checked for other health conditions. · Find a counselor for your child. Seeing a counselor along with taking the medicine can help your child. Ask your child's doctor for a referral.  If you feel that your child might hurt themself, get help right away. Call the [address] [place] [address] at 1-800-273-talk (3-809.797.7608). Or text HOME to 388521 to access the [employer] Text Line. Where can you learn more? Go to http://www.gray.com/  Enter A256 in the search box to learn more about \"Learning About How to Care for Your Child Who Is Starting Medicines for ADHD. \"  Current as of: November 29, 2021               Content Version: 13.2  © 2755-0622 [employer]. Care instructions adapted under license by [employer] (which disclaims liability or warranty for this information). If you have questions about a medical condition or this instruction, always ask your healthcare professional. [place] [address] any warranty or liability for your use of this information.

## 2022-04-22 NOTE — PROGRESS NOTES
Cristy Doetingham is a 19 y.o. adult presents for follow up   HPI   She is tired, goes to sleep at 4 AM  She wants to try a different ADHD medication. Off medication for sometime because she has a lot going on    Lost appetite and had  nausea on Adderall     Past Medical History:   Diagnosis Date    Contact dermatitis and other eczema, due to unspecified cause     Depression     Gastroparesis     GERD (gastroesophageal reflux disease)      Current Outpatient Medications   Medication Sig    lisdexamfetamine (VYVANSE) 30 mg capsule Take 1 Capsule by mouth daily. Max Daily Amount: 30 mg.    testosterone cypionate (DEPOTESTOTERONE CYPIONATE) 200 mg/mL injection     Pediatric Multivit Comb#19-FA (Flintstones Multi-Vit Gummies) 200 mcg chew     buPROPion XL (WELLBUTRIN XL) 300 mg XL tablet Take 1 Tab by mouth every morning. With 150mg for a total of 450mg    escitalopram oxalate (LEXAPRO) 20 mg tablet Take 1 Tab by mouth daily. Indications: repeated episodes of anxiety, major depressive disorder    polyethylene glycol (MIRALAX) 17 gram/dose powder Take 17 g by mouth daily.  melatonin 1 mg/mL liqd Take 5 mg by mouth nightly as needed.  simethicone (GAS-X) 125 mg capsule Take 125 mg by mouth four (4) times daily as needed for Flatulence. No current facility-administered medications for this visit. Allergies   Allergen Reactions    Peanut Anaphylaxis        Review of Systems   Constitutional: Positive for malaise/fatigue. Eyes: Negative. Cardiovascular: Negative. Skin: Negative. Neurological: Negative. Psychiatric/Behavioral: Negative. Objective  Physical Exam  Constitutional:       General: Ilean Oh \"Lopez\" is not in acute distress. Appearance: Normal appearance. Ilean Oh \"Lopez\" is not ill-appearing. Cardiovascular:      Rate and Rhythm: Normal rate and regular rhythm. Pulses: Normal pulses. Heart sounds: Normal heart sounds. Pulmonary:      Effort: Pulmonary effort is normal.      Breath sounds: Normal breath sounds. Skin:     General: Skin is warm and dry. Neurological:      Mental Status: Shanel Ferrer \"Lopez\" is alert. Mental status is at baseline. Psychiatric:         Mood and Affect: Mood normal.         Behavior: Behavior normal.         Thought Content: Thought content normal.          Assessment & Plan    ICD-10-CM ICD-9-CM    1. Attention deficit hyperactivity disorder (ADHD), unspecified ADHD type  F90.9 314.01 lisdexamfetamine (VYVANSE) 30 mg capsule     Diagnoses and all orders for this visit:    1. Attention deficit hyperactivity disorder (ADHD), unspecified ADHD type  -     lisdexamfetamine (VYVANSE) 30 mg capsule; Take 1 Capsule by mouth daily. Max Daily Amount: 30 mg. Follow-up and Dispositions    · Return in about 4 weeks (around 5/20/2022) for adhd. reviewed medications and side effects in detail    Patient voices understanding and acceptance of this advice and will call back if any further questions or concerns.   Parrish Nevarez NP

## 2022-04-28 DIAGNOSIS — F90.9 ATTENTION DEFICIT HYPERACTIVITY DISORDER (ADHD), UNSPECIFIED ADHD TYPE: Primary | ICD-10-CM

## 2022-04-28 RX ORDER — METHYLPHENIDATE HYDROCHLORIDE 27 MG/1
27 TABLET ORAL
Qty: 30 TABLET | Refills: 0 | Status: SHIPPED | OUTPATIENT
Start: 2022-04-28

## 2022-06-08 ENCOUNTER — PATIENT MESSAGE (OUTPATIENT)
Dept: ORTHOPEDIC SURGERY | Age: 22
End: 2022-06-08

## 2022-06-09 ENCOUNTER — TELEPHONE (OUTPATIENT)
Dept: ORTHOPEDIC SURGERY | Age: 22
End: 2022-06-09

## 2022-10-07 DIAGNOSIS — F32.A ANXIETY AND DEPRESSION: ICD-10-CM

## 2022-10-07 DIAGNOSIS — F41.9 ANXIETY AND DEPRESSION: ICD-10-CM

## 2022-10-08 RX ORDER — ESCITALOPRAM OXALATE 20 MG/1
TABLET ORAL
Qty: 90 TABLET | Refills: 2 | Status: SHIPPED | OUTPATIENT
Start: 2022-10-08

## 2022-10-08 RX ORDER — BUPROPION HYDROCHLORIDE 300 MG/1
TABLET ORAL
Qty: 90 TABLET | Refills: 2 | Status: SHIPPED | OUTPATIENT
Start: 2022-10-08

## 2023-05-26 RX ORDER — POLYETHYLENE GLYCOL 3350 17 G/17G
17 POWDER, FOR SOLUTION ORAL DAILY
COMMUNITY

## 2023-05-26 RX ORDER — BUPROPION HYDROCHLORIDE 300 MG/1
TABLET ORAL
COMMUNITY
Start: 2022-10-08

## 2023-05-26 RX ORDER — SIMETHICONE 125 MG
125 CAPSULE ORAL 4 TIMES DAILY PRN
COMMUNITY

## 2023-05-26 RX ORDER — METHYLPHENIDATE HYDROCHLORIDE 27 MG/1
27 TABLET, EXTENDED RELEASE ORAL
COMMUNITY
Start: 2022-04-28

## 2023-05-26 RX ORDER — ESCITALOPRAM OXALATE 20 MG/1
TABLET ORAL
COMMUNITY
Start: 2022-10-08

## 2023-05-26 RX ORDER — TESTOSTERONE CYPIONATE 200 MG/ML
INJECTION, SOLUTION INTRAMUSCULAR
COMMUNITY
Start: 2021-11-07

## 2023-07-27 ENCOUNTER — TELEMEDICINE (OUTPATIENT)
Age: 23
End: 2023-07-27

## 2023-07-27 DIAGNOSIS — F90.2 ATTENTION DEFICIT HYPERACTIVITY DISORDER (ADHD), COMBINED TYPE: Primary | ICD-10-CM

## 2023-07-27 DIAGNOSIS — F32.A DEPRESSION, UNSPECIFIED DEPRESSION TYPE: ICD-10-CM

## 2023-07-27 DIAGNOSIS — F41.9 ANXIETY DISORDER, UNSPECIFIED TYPE: ICD-10-CM

## 2023-07-27 PROCEDURE — 99213 OFFICE O/P EST LOW 20 MIN: CPT | Performed by: NURSE PRACTITIONER

## 2023-07-27 NOTE — PROGRESS NOTES
Referral To Psychiatry     Advised this provider will be leaving practice   Strongly encouraged to establish care with [facility]       Plan:      Return if symptoms worsen or fail to improve. Patient voices understanding and acceptance of this advice and will call back if any further questions or concerns. Malorie Fisher, was evaluated through a synchronous (real-time) audio-video encounter. The patient (or guardian if applicable) is aware that this is a billable service, which includes applicable co-pays. This Virtual Visit was conducted with patient's (and/or legal guardian's) consent. Patient identification was verified, and a caregiver was present when appropriate. The patient was located at Home: [address] [facility]  Provider was located at [facility] (Appt Dept): [address] [facility],  [address] [facility]           --PAMELA Bocanegra NP on 7/28/2023 at 2:15 AM    An electronic signature was used to authenticate this note.       PAMELA Bocanegra NP

## 2023-11-10 SDOH — HEALTH STABILITY: PHYSICAL HEALTH: ON AVERAGE, HOW MANY DAYS PER WEEK DO YOU ENGAGE IN MODERATE TO STRENUOUS EXERCISE (LIKE A BRISK WALK)?: 4 DAYS

## 2023-11-10 SDOH — HEALTH STABILITY: PHYSICAL HEALTH: ON AVERAGE, HOW MANY MINUTES DO YOU ENGAGE IN EXERCISE AT THIS LEVEL?: 30 MIN

## 2023-11-10 ASSESSMENT — SOCIAL DETERMINANTS OF HEALTH (SDOH)
WITHIN THE LAST YEAR, HAVE YOU BEEN HUMILIATED OR EMOTIONALLY ABUSED IN OTHER WAYS BY YOUR PARTNER OR EX-PARTNER?: PATIENT DECLINED
WITHIN THE LAST YEAR, HAVE TO BEEN RAPED OR FORCED TO HAVE ANY KIND OF SEXUAL ACTIVITY BY YOUR PARTNER OR EX-PARTNER?: PATIENT DECLINED
WITHIN THE LAST YEAR, HAVE YOU BEEN KICKED, HIT, SLAPPED, OR OTHERWISE PHYSICALLY HURT BY YOUR PARTNER OR EX-PARTNER?: PATIENT DECLINED
WITHIN THE LAST YEAR, HAVE YOU BEEN AFRAID OF YOUR PARTNER OR EX-PARTNER?: PATIENT DECLINED

## 2023-11-13 ENCOUNTER — OFFICE VISIT (OUTPATIENT)
Facility: CLINIC | Age: 23
End: 2023-11-13
Payer: COMMERCIAL

## 2023-11-13 VITALS
TEMPERATURE: 97.9 F | SYSTOLIC BLOOD PRESSURE: 119 MMHG | WEIGHT: 124 LBS | HEART RATE: 59 BPM | BODY MASS INDEX: 22.82 KG/M2 | HEIGHT: 62 IN | OXYGEN SATURATION: 97 % | RESPIRATION RATE: 16 BRPM | DIASTOLIC BLOOD PRESSURE: 81 MMHG

## 2023-11-13 DIAGNOSIS — M65.4 DE QUERVAIN'S TENOSYNOVITIS, RIGHT: Primary | ICD-10-CM

## 2023-11-13 PROCEDURE — 99214 OFFICE O/P EST MOD 30 MIN: CPT | Performed by: FAMILY MEDICINE

## 2023-11-13 RX ORDER — NAPROXEN 500 MG/1
500 TABLET ORAL 2 TIMES DAILY WITH MEALS
Qty: 30 TABLET | Refills: 0 | Status: SHIPPED | OUTPATIENT
Start: 2023-11-13 | End: 2023-11-28

## 2023-11-13 SDOH — ECONOMIC STABILITY: FOOD INSECURITY: WITHIN THE PAST 12 MONTHS, YOU WORRIED THAT YOUR FOOD WOULD RUN OUT BEFORE YOU GOT MONEY TO BUY MORE.: SOMETIMES TRUE

## 2023-11-13 SDOH — ECONOMIC STABILITY: FOOD INSECURITY: WITHIN THE PAST 12 MONTHS, THE FOOD YOU BOUGHT JUST DIDN'T LAST AND YOU DIDN'T HAVE MONEY TO GET MORE.: NEVER TRUE

## 2023-11-13 SDOH — ECONOMIC STABILITY: HOUSING INSECURITY
IN THE LAST 12 MONTHS, WAS THERE A TIME WHEN YOU DID NOT HAVE A STEADY PLACE TO SLEEP OR SLEPT IN A SHELTER (INCLUDING NOW)?: NO

## 2023-11-13 SDOH — ECONOMIC STABILITY: INCOME INSECURITY: HOW HARD IS IT FOR YOU TO PAY FOR THE VERY BASICS LIKE FOOD, HOUSING, MEDICAL CARE, AND HEATING?: HARD

## 2023-11-13 ASSESSMENT — PATIENT HEALTH QUESTIONNAIRE - PHQ9
1. LITTLE INTEREST OR PLEASURE IN DOING THINGS: 0
SUM OF ALL RESPONSES TO PHQ QUESTIONS 1-9: 0
SUM OF ALL RESPONSES TO PHQ9 QUESTIONS 1 & 2: 0
SUM OF ALL RESPONSES TO PHQ QUESTIONS 1-9: 0
2. FEELING DOWN, DEPRESSED OR HOPELESS: 0
SUM OF ALL RESPONSES TO PHQ QUESTIONS 1-9: 0
SUM OF ALL RESPONSES TO PHQ QUESTIONS 1-9: 0

## 2024-01-08 ENCOUNTER — OFFICE VISIT (OUTPATIENT)
Facility: CLINIC | Age: 24
End: 2024-01-08
Payer: COMMERCIAL

## 2024-01-08 VITALS
SYSTOLIC BLOOD PRESSURE: 102 MMHG | TEMPERATURE: 98.5 F | DIASTOLIC BLOOD PRESSURE: 69 MMHG | WEIGHT: 124 LBS | BODY MASS INDEX: 22.82 KG/M2 | HEART RATE: 99 BPM | RESPIRATION RATE: 16 BRPM | OXYGEN SATURATION: 97 % | HEIGHT: 62 IN

## 2024-01-08 DIAGNOSIS — Z11.59 NEED FOR HEPATITIS C SCREENING TEST: ICD-10-CM

## 2024-01-08 DIAGNOSIS — S16.1XXA STRAIN OF NECK MUSCLE, INITIAL ENCOUNTER: ICD-10-CM

## 2024-01-08 DIAGNOSIS — Z00.00 ROUTINE ADULT HEALTH MAINTENANCE: Primary | ICD-10-CM

## 2024-01-08 PROCEDURE — 99213 OFFICE O/P EST LOW 20 MIN: CPT | Performed by: FAMILY MEDICINE

## 2024-01-08 PROCEDURE — 99395 PREV VISIT EST AGE 18-39: CPT | Performed by: FAMILY MEDICINE

## 2024-01-08 ASSESSMENT — PATIENT HEALTH QUESTIONNAIRE - PHQ9
1. LITTLE INTEREST OR PLEASURE IN DOING THINGS: 0
SUM OF ALL RESPONSES TO PHQ QUESTIONS 1-9: 0
SUM OF ALL RESPONSES TO PHQ QUESTIONS 1-9: 0
10. IF YOU CHECKED OFF ANY PROBLEMS, HOW DIFFICULT HAVE THESE PROBLEMS MADE IT FOR YOU TO DO YOUR WORK, TAKE CARE OF THINGS AT HOME, OR GET ALONG WITH OTHER PEOPLE: 0
SUM OF ALL RESPONSES TO PHQ9 QUESTIONS 1 & 2: 0
6. FEELING BAD ABOUT YOURSELF - OR THAT YOU ARE A FAILURE OR HAVE LET YOURSELF OR YOUR FAMILY DOWN: 0
9. THOUGHTS THAT YOU WOULD BE BETTER OFF DEAD, OR OF HURTING YOURSELF: 0
2. FEELING DOWN, DEPRESSED OR HOPELESS: 0
SUM OF ALL RESPONSES TO PHQ QUESTIONS 1-9: 0
SUM OF ALL RESPONSES TO PHQ QUESTIONS 1-9: 0
2. FEELING DOWN, DEPRESSED OR HOPELESS: 0
SUM OF ALL RESPONSES TO PHQ QUESTIONS 1-9: 0
SUM OF ALL RESPONSES TO PHQ QUESTIONS 1-9: 0
7. TROUBLE CONCENTRATING ON THINGS, SUCH AS READING THE NEWSPAPER OR WATCHING TELEVISION: 0
SUM OF ALL RESPONSES TO PHQ QUESTIONS 1-9: 0
3. TROUBLE FALLING OR STAYING ASLEEP: 0
SUM OF ALL RESPONSES TO PHQ9 QUESTIONS 1 & 2: 0
8. MOVING OR SPEAKING SO SLOWLY THAT OTHER PEOPLE COULD HAVE NOTICED. OR THE OPPOSITE, BEING SO FIGETY OR RESTLESS THAT YOU HAVE BEEN MOVING AROUND A LOT MORE THAN USUAL: 0
4. FEELING TIRED OR HAVING LITTLE ENERGY: 0
1. LITTLE INTEREST OR PLEASURE IN DOING THINGS: 0
SUM OF ALL RESPONSES TO PHQ QUESTIONS 1-9: 0
5. POOR APPETITE OR OVEREATING: 0

## 2024-01-08 NOTE — PROGRESS NOTES
Chief Complaint   Patient presents with    Annual Exam     Patient is here for a wellness visit.     Alis Stahl \"Mitchel\" is a 23 y.o. year old adult who presents for CPE.  Complete Physical Exam Questions:    1.  Do you follow a low fat diet?  no  2.  Are you up to date on your Tdap (<10 years)?  Yes  3.  Have you ever had a Pneumovax vaccine (>65)?  Not applicable   PCV13 Not applicable   PPSV23 Not applicable  4.  Have you had Zoster vaccine (>60)? Not applicable  5.  Have you had the HPV - Gardasil (13- 26)? Unknown  6.  Do you follow an exercise program?  Yes  7.  Do you smoke?  Yes If > 65 and smoker, have you had a abdominal aortic aneurysm ultrasound screen?  No  8.  Do you consider yourself overweight?  No  9.  Is there a family history of CAD< age 50?  Unknown  10.  Is there a family history of Cancer?  Unknown  11.  Do you know your Cancer risks? Yes  12.  Have you had a colonoscopy?  No  13. Have you been tested for HIV or other STI's? No HIV today(18-64 y/o)?No   14.  Have you had an EKG in the last five years(>50)?Not applicable  15.  Have you had a PSA test done this year (50-69)? Not applicable    Other complaints:  Patient states there is a knot on neck .     Reviewed and agree with Nurse Note and duplicated in this note.  Reviewed PmHx, RxHx, FmHx, SocHx, AllgHx and updated and dated in the chart.    Family History   Problem Relation Age of Onset    Ovarian Cancer Maternal Grandmother     Depression Maternal Grandmother     Stroke Maternal Grandmother     Hypertension Mother     Depression Mother        Past Medical History:   Diagnosis Date    ADHD (attention deficit hyperactivity disorder)     Anxiety     Contact dermatitis and other eczema, due to unspecified cause     Depression     Gastroparesis     GERD (gastroesophageal reflux disease)       Social History     Socioeconomic History    Marital status: Single   Tobacco Use    Smoking status: Never    Smokeless tobacco: Never   Substance

## 2024-01-09 LAB
ALBUMIN SERPL-MCNC: 4.5 G/DL (ref 3.5–5)
ALBUMIN/GLOB SERPL: 1.3 (ref 1.1–2.2)
ALP SERPL-CCNC: 83 U/L (ref 45–117)
ALT SERPL-CCNC: 28 U/L (ref 12–78)
ANION GAP SERPL CALC-SCNC: 7 MMOL/L (ref 5–15)
AST SERPL-CCNC: 18 U/L (ref 15–37)
BASOPHILS # BLD: 0.1 K/UL (ref 0–0.1)
BASOPHILS NFR BLD: 2 % (ref 0–1)
BILIRUB SERPL-MCNC: 0.5 MG/DL (ref 0.2–1)
BUN SERPL-MCNC: 9 MG/DL (ref 6–20)
BUN/CREAT SERPL: 9 (ref 12–20)
CALCIUM SERPL-MCNC: 9.2 MG/DL (ref 8.5–10.1)
CHLORIDE SERPL-SCNC: 105 MMOL/L (ref 97–108)
CHOLEST SERPL-MCNC: 172 MG/DL
CO2 SERPL-SCNC: 28 MMOL/L (ref 21–32)
COMMENT:: NORMAL
CREAT SERPL-MCNC: 1 MG/DL (ref 0.55–1.02)
DIFFERENTIAL METHOD BLD: ABNORMAL
EOSINOPHIL # BLD: 0.7 K/UL (ref 0–0.4)
EOSINOPHIL NFR BLD: 11 % (ref 0–7)
ERYTHROCYTE [DISTWIDTH] IN BLOOD BY AUTOMATED COUNT: 12.4 % (ref 11.5–14.5)
GLOBULIN SER CALC-MCNC: 3.5 G/DL (ref 2–4)
GLUCOSE SERPL-MCNC: 86 MG/DL (ref 65–100)
HCT VFR BLD AUTO: 45.4 % (ref 35–47)
HCV AB SER IA-ACNC: 0.16 INDEX
HCV AB SERPL QL IA: NONREACTIVE
HDLC SERPL-MCNC: 58 MG/DL
HDLC SERPL: 3 (ref 0–5)
HGB BLD-MCNC: 14.6 G/DL (ref 11.5–16)
HIV 1+2 AB+HIV1 P24 AG SERPL QL IA: NONREACTIVE
HIV 1/2 RESULT COMMENT: NORMAL
IMM GRANULOCYTES # BLD AUTO: 0 K/UL (ref 0–0.04)
IMM GRANULOCYTES NFR BLD AUTO: 0 % (ref 0–0.5)
LDLC SERPL CALC-MCNC: 96.2 MG/DL (ref 0–100)
LYMPHOCYTES # BLD: 2.1 K/UL (ref 0.8–3.5)
LYMPHOCYTES NFR BLD: 33 % (ref 12–49)
MCH RBC QN AUTO: 30 PG (ref 26–34)
MCHC RBC AUTO-ENTMCNC: 32.2 G/DL (ref 30–36.5)
MCV RBC AUTO: 93.2 FL (ref 80–99)
MONOCYTES # BLD: 0.6 K/UL (ref 0–1)
MONOCYTES NFR BLD: 10 % (ref 5–13)
NEUTS SEG # BLD: 2.7 K/UL (ref 1.8–8)
NEUTS SEG NFR BLD: 44 % (ref 32–75)
NRBC # BLD: 0 K/UL (ref 0–0.01)
NRBC BLD-RTO: 0 PER 100 WBC
PLATELET # BLD AUTO: 338 K/UL (ref 150–400)
PMV BLD AUTO: 10.4 FL (ref 8.9–12.9)
POTASSIUM SERPL-SCNC: 4.1 MMOL/L (ref 3.5–5.1)
PROT SERPL-MCNC: 8 G/DL (ref 6.4–8.2)
RBC # BLD AUTO: 4.87 M/UL (ref 3.8–5.2)
SODIUM SERPL-SCNC: 140 MMOL/L (ref 136–145)
SPECIMEN HOLD: NORMAL
TRIGL SERPL-MCNC: 89 MG/DL
VLDLC SERPL CALC-MCNC: 17.8 MG/DL
WBC # BLD AUTO: 6.2 K/UL (ref 3.6–11)

## 2024-06-03 ENCOUNTER — OFFICE VISIT (OUTPATIENT)
Facility: CLINIC | Age: 24
End: 2024-06-03
Payer: COMMERCIAL

## 2024-06-03 VITALS
RESPIRATION RATE: 16 BRPM | HEIGHT: 62 IN | TEMPERATURE: 98.5 F | HEART RATE: 90 BPM | DIASTOLIC BLOOD PRESSURE: 82 MMHG | WEIGHT: 123 LBS | BODY MASS INDEX: 22.63 KG/M2 | SYSTOLIC BLOOD PRESSURE: 129 MMHG | OXYGEN SATURATION: 96 %

## 2024-06-03 DIAGNOSIS — E55.9 VITAMIN D DEFICIENCY: ICD-10-CM

## 2024-06-03 DIAGNOSIS — M65.4 DE QUERVAIN'S TENOSYNOVITIS, RIGHT: Primary | ICD-10-CM

## 2024-06-03 PROCEDURE — 99214 OFFICE O/P EST MOD 30 MIN: CPT | Performed by: FAMILY MEDICINE

## 2024-06-03 RX ORDER — CHOLECALCIFEROL TAB 125 MCG (5000 UNIT) 125 MCG (5000 UT)
5000 TAB ONCE
COMMUNITY
Start: 2024-04-11

## 2024-06-03 NOTE — PROGRESS NOTES
right wrist.  The patient is advised to utilize a wrap during physical activity to mitigate the risk of excessive wrist movement. The subsequent course of action will involve cortisone injections. If anti-inflammatories and physical therapy prove ineffective, alternative treatment options will be considered. An MRI of the right wrist will be ordered.    2. Vitamin D deficiency.  The patient was informed that any vitamin D levels below 30 are typically within the normal range. A repeat vitamin D level test will be conducted today. A prescription-strength vitamin D supplement will be prescribed for a duration of 7 weeks, after which the patient will be transitioned to over-the-counter vitamin D. Should the vitamin D level be found to be below 30, a prescription will be sent for an additional 7 weeks, after which the patient will transition to over-the-counter supplements.      Return if symptoms worsen or fail to improve.                     I have discussed the diagnosis with the patient and the intended plan as seen in the above orders.  The patient has received an after-visit summary and questions were answered concerning future plans.     Medication Side Effects and Warnings were discussed with patient,  Patient Labs were reviewed and or requested, and  Patient Past Records were reviewed and or requested  yes     Pt agrees to call or return to clinic and/or go to closest ER with any worsening of symptoms.  This may include, but not limited to increased fever (>100.4) with NSAIDS or Tylenol, increased edema, confusion, rash, worsening of presenting symptoms.    Please note that this dictation was completed with IPG, the computer voice recognition software.  Quite often unanticipated grammatical, syntax, homophones, and other interpretive errors are inadvertently transcribed by the computer software.  Please disregard these errors.  Please excuse any errors that have escaped final proofreading.  Thank you.

## 2024-06-17 ENCOUNTER — TELEPHONE (OUTPATIENT)
Facility: CLINIC | Age: 24
End: 2024-06-17

## 2024-06-18 DIAGNOSIS — M65.4 DE QUERVAIN'S TENOSYNOVITIS, RIGHT: Primary | ICD-10-CM

## 2024-10-14 NOTE — PROGRESS NOTES
Angus Garcia is a 19 y.o. female. HPI     She stopped Adderall 1 month ago. She is busy at work. Feels fine off Adderall   Medication decreased appetite. Preparing for \"top\"  surgery on 2/24, provider Dr. Mirta Oscar to receive testosterone injections from [facility] providers     Wellbutrin and Lexapro effective for depression /anxiety. Denies ADRS    right wrist pain and spasm flare up if she use thumb to pick  up objects   Symptoms  started 2 months ago   Limited ROM right thumb   Denies specific injury      Past Medical History:   Diagnosis Date    Contact dermatitis and other eczema, due to unspecified cause     Depression     Gastroparesis     GERD (gastroesophageal reflux disease)        Allergies   Allergen Reactions    Peanut Anaphylaxis     Current Outpatient Medications   Medication Sig    testosterone cypionate (DEPOTESTOTERONE CYPIONATE) 200 mg/mL injection     Pediatric Multivit Comb#19-FA (Flintstones Multi-Vit Gummies) 200 mcg chew     buPROPion XL (WELLBUTRIN XL) 300 mg XL tablet Take 1 Tab by mouth every morning. With 150mg for a total of 450mg    escitalopram oxalate (LEXAPRO) 20 mg tablet Take 1 Tab by mouth daily. Indications: repeated episodes of anxiety, major depressive disorder    polyethylene glycol (MIRALAX) 17 gram/dose powder Take 17 g by mouth daily.  melatonin 1 mg/mL liqd Take 5 mg by mouth nightly as needed.  simethicone (GAS-X) 125 mg capsule Take 125 mg by mouth four (4) times daily as needed for Flatulence. No current facility-administered medications for this visit. Review of Systems   Constitutional: Negative. HENT: Negative. Eyes: Negative. Respiratory: Negative. Cardiovascular: Negative. Gastrointestinal: Negative. Genitourinary: Negative. Musculoskeletal: Positive for joint pain. Skin: Negative. Psychiatric/Behavioral: Negative for depression. The patient is not nervous/anxious. Will increase your dose of oxycodone/acetaminophen from 5/325 mg tablet to 7.5/325 mg tablet to take 1 tablet up to 2 times daily as needed for pain.      It would be best for you to take the tramadol extended release 100 mg tablet, 1 tablet every day as a baseline for pain relief and then take the oxycodone/acetaminophen 7.5/325 mg tablet 1 tablet up to 2 times daily as needed for breakthrough pain.    Follow-up with Dr. Douglass as scheduled on 10/28/2024.    Please have x-rays of the lumbar spine taken prior to that visit so Dr. Douglass is able to review them.  Order for the lumbar spine x-rays has been placed.  You can go to any of the [facility] clinics that have radiology to have these x-rays done prior to the appointment with Dr. Douglass.    May take additional Tylenol as needed for pain, however, keep in mind the max dose of Tylenol (acetaminophen) is 3000 mg/day which must include the acetaminophen and the opioid medication that you take daily.       Objective  Visit Vitals  /78 (BP 1 Location: Left upper arm, BP Patient Position: Sitting, BP Cuff Size: Adult)   Pulse 81   Temp 98.2 °F (36.8 °C) (Oral)   Resp 16   Ht 5' 2\" (1.575 m)   Wt 128 lb 3.2 oz (58.2 kg)   SpO2 96%   BMI 23.45 kg/m²     Physical Exam  Constitutional:       General: She is not in acute distress. Appearance: Normal appearance. She is not ill-appearing. Cardiovascular:      Rate and Rhythm: Normal rate and regular rhythm. Pulses: Normal pulses. Heart sounds: Normal heart sounds. Musculoskeletal:      Right hand: No deformity. Decreased range of motion. Left hand: No deformity. Normal range of motion. Comments: Decreased range of motion right thumb 2/2 pain at base of finger, thenar eminence mildly edematous, tender    Skin:     General: Skin is warm and dry. Findings: No erythema or rash. Neurological:      Mental Status: She is alert. Mental status is at baseline. Psychiatric:         Mood and Affect: Mood normal.         Behavior: Behavior normal.         Thought Content: Thought content normal.          Assessment & Plan    ICD-10-CM ICD-9-CM    1. Anxiety and depression  F41.9 300.00     F32. A 311    2. Attention deficit hyperactivity disorder (ADHD), unspecified ADHD type  F90.9 314.01    3. Right wrist pain  M25.531 719.43 REFERRAL TO ORTHOPEDICS   4. Thumb pain, right  M79.644 729.5 REFERRAL TO ORTHOPEDICS   5. Gender identity disorder of adulthood  F64.0 302.85      Follow-up and Dispositions    · Return in about 6 months (around 8/9/2022) for add. 1. Controlled. 2. Controlled. off medication.   3-4. Discussed reasons for ortho consult. Encouraged use of Acetaminophen/NSAIDs prn   5. Continue care with specialists  reviewed medications and side effects in detail      Patient voices understanding and acceptance of this advice and will call back if any further questions or concerns.   Rogelio Moya NP

## 2024-11-27 NOTE — PROGRESS NOTES
Rm 7    Recent Travel Screening and Travel History documentation     Travel Screening     Question   Response    In the last month, have you been in contact with someone who was confirmed or suspected to have Coronavirus / COVID-19? No / Unsure    Have you had a COVID-19 viral test in the last 14 days? No    Do you have any of the following new or worsening symptoms? None of these    Have you traveled internationally or domestically in the last month? No      Travel History   Travel since 03/27/21     No documented travel since 03/27/21          Chief Complaint   Patient presents with    Behavioral Problem     ADHD         1. Have you been to the ER, urgent care clinic since your last visit? Hospitalized since your last visit? No    2. Have you seen or consulted any other health care providers outside of the [address] [place] [address] since your last visit? Include any pap smears or colon screening.  No        Health Maintenance Due   Topic Date Due    Hepatitis C Screening  Never done    DTaP/Tdap/Td series (2 - Tdap) 09/16/2009    COVID-19 Vaccine (1) Never done    HPV Age 9Y-34Y (2 - 3-dose series) 03/08/2016           3 most recent PHQ Screens 4/27/2021   Little interest or pleasure in doing things Not at all   Feeling down, depressed, irritable, or hopeless Several days   Total Score PHQ 2 1   Trouble falling or staying asleep, or sleeping too much -   Feeling tired or having little energy -   Poor appetite, weight loss, or overeating -   Feeling bad about yourself - or that you are a failure or have let yourself or your family down -   Trouble concentrating on things such as school, work, reading, or watching TV -   Moving or speaking so slowly that other people could have noticed; or the opposite being so fidgety that others notice -   Thoughts of being better off dead, or hurting yourself in some way -   PHQ 9 Score -   How difficult have these problems made it for you to do your work, take care of your home and get along with others -           Learning Assessment 8/12/2019   PRIMARY LEARNER Patient   PRIMARY LANGUAGE ENGLISH   LEARNER PREFERENCE PRIMARY VIDEOS   ANSWERED BY patient   RELATIONSHIP SELF         An After Visit Summary was printed and given to the patient. 16

## 2024-12-19 RX ORDER — ERGOCALCIFEROL 1.25 MG/1
50000 CAPSULE, LIQUID FILLED ORAL WEEKLY
Qty: 12 CAPSULE | Refills: 1 | Status: SHIPPED | OUTPATIENT
Start: 2024-12-19

## 2025-06-25 RX ORDER — ERGOCALCIFEROL 1.25 MG/1
50000 CAPSULE, LIQUID FILLED ORAL WEEKLY
Qty: 12 CAPSULE | Refills: 1 | OUTPATIENT
Start: 2025-06-25

## 2025-06-30 RX ORDER — ERGOCALCIFEROL 1.25 MG/1
50000 CAPSULE, LIQUID FILLED ORAL WEEKLY
Qty: 12 CAPSULE | Refills: 1 | OUTPATIENT
Start: 2025-06-30

## 2025-07-01 RX ORDER — ERGOCALCIFEROL 1.25 MG/1
50000 CAPSULE, LIQUID FILLED ORAL WEEKLY
Qty: 12 CAPSULE | Refills: 1 | OUTPATIENT
Start: 2025-07-01